# Patient Record
Sex: FEMALE | Race: WHITE | URBAN - METROPOLITAN AREA
[De-identification: names, ages, dates, MRNs, and addresses within clinical notes are randomized per-mention and may not be internally consistent; named-entity substitution may affect disease eponyms.]

---

## 2020-09-15 ENCOUNTER — APPOINTMENT (RX ONLY)
Dept: URBAN - METROPOLITAN AREA CLINIC 374 | Facility: CLINIC | Age: 70
Setting detail: DERMATOLOGY
End: 2020-09-15

## 2020-09-15 DIAGNOSIS — L81.4 OTHER MELANIN HYPERPIGMENTATION: ICD-10-CM

## 2020-09-15 DIAGNOSIS — D22 MELANOCYTIC NEVI: ICD-10-CM

## 2020-09-15 DIAGNOSIS — Z71.89 OTHER SPECIFIED COUNSELING: ICD-10-CM

## 2020-09-15 DIAGNOSIS — L82.1 OTHER SEBORRHEIC KERATOSIS: ICD-10-CM

## 2020-09-15 DIAGNOSIS — D18.0 HEMANGIOMA: ICD-10-CM

## 2020-09-15 DIAGNOSIS — L57.0 ACTINIC KERATOSIS: ICD-10-CM

## 2020-09-15 DIAGNOSIS — D17 BENIGN LIPOMATOUS NEOPLASM: ICD-10-CM

## 2020-09-15 DIAGNOSIS — D485 NEOPLASM OF UNCERTAIN BEHAVIOR OF SKIN: ICD-10-CM

## 2020-09-15 DIAGNOSIS — L82.0 INFLAMED SEBORRHEIC KERATOSIS: ICD-10-CM

## 2020-09-15 PROBLEM — D48.5 NEOPLASM OF UNCERTAIN BEHAVIOR OF SKIN: Status: ACTIVE | Noted: 2020-09-15

## 2020-09-15 PROBLEM — D18.01 HEMANGIOMA OF SKIN AND SUBCUTANEOUS TISSUE: Status: ACTIVE | Noted: 2020-09-15

## 2020-09-15 PROBLEM — D22.5 MELANOCYTIC NEVI OF TRUNK: Status: ACTIVE | Noted: 2020-09-15

## 2020-09-15 PROBLEM — D17.22 BENIGN LIPOMATOUS NEOPLASM OF SKIN AND SUBCUTANEOUS TISSUE OF LEFT ARM: Status: ACTIVE | Noted: 2020-09-15

## 2020-09-15 PROCEDURE — ? PHOTO-DOCUMENTATION

## 2020-09-15 PROCEDURE — 99203 OFFICE O/P NEW LOW 30 MIN: CPT

## 2020-09-15 PROCEDURE — ? COUNSELING

## 2020-09-15 PROCEDURE — ? DEFER

## 2020-09-15 PROCEDURE — ? FULL BODY SKIN EXAM

## 2020-09-15 ASSESSMENT — LOCATION SIMPLE DESCRIPTION DERM
LOCATION SIMPLE: LEFT UPPER BACK
LOCATION SIMPLE: LEFT HAND
LOCATION SIMPLE: RIGHT UPPER ARM
LOCATION SIMPLE: LEFT NOSE
LOCATION SIMPLE: ABDOMEN
LOCATION SIMPLE: LEFT SHOULDER
LOCATION SIMPLE: LEFT ZYGOMA
LOCATION SIMPLE: LEFT CHEEK
LOCATION SIMPLE: RIGHT FOREHEAD
LOCATION SIMPLE: NOSE
LOCATION SIMPLE: RIGHT CHEEK
LOCATION SIMPLE: LEFT UPPER ARM
LOCATION SIMPLE: CHEST
LOCATION SIMPLE: RIGHT UPPER BACK
LOCATION SIMPLE: UPPER BACK
LOCATION SIMPLE: LEFT THIGH

## 2020-09-15 ASSESSMENT — LOCATION ZONE DERM
LOCATION ZONE: HAND
LOCATION ZONE: ARM
LOCATION ZONE: LEG
LOCATION ZONE: NOSE
LOCATION ZONE: FACE
LOCATION ZONE: TRUNK

## 2020-09-15 ASSESSMENT — LOCATION DETAILED DESCRIPTION DERM
LOCATION DETAILED: LEFT ANTERIOR SHOULDER
LOCATION DETAILED: LEFT MID-UPPER BACK
LOCATION DETAILED: LEFT ANTERIOR DISTAL UPPER ARM
LOCATION DETAILED: RIGHT INFERIOR CENTRAL MALAR CHEEK
LOCATION DETAILED: EPIGASTRIC SKIN
LOCATION DETAILED: LEFT RADIAL DORSAL HAND
LOCATION DETAILED: RIGHT INFERIOR MEDIAL UPPER BACK
LOCATION DETAILED: LEFT ANTERIOR PROXIMAL THIGH
LOCATION DETAILED: RIGHT LATERAL SUPERIOR CHEST
LOCATION DETAILED: LEFT LATERAL ZYGOMA
LOCATION DETAILED: NASAL DORSUM
LOCATION DETAILED: INFERIOR THORACIC SPINE
LOCATION DETAILED: LEFT NASAL SIDEWALL
LOCATION DETAILED: UPPER STERNUM
LOCATION DETAILED: LEFT INFERIOR CENTRAL MALAR CHEEK
LOCATION DETAILED: LEFT SUPERIOR CENTRAL MALAR CHEEK
LOCATION DETAILED: RIGHT INFERIOR MEDIAL FOREHEAD
LOCATION DETAILED: RIGHT ANTERIOR DISTAL UPPER ARM

## 2020-09-15 NOTE — PROCEDURE: DEFER
Detail Level: Detailed
Introduction Text (Please End With A Colon): The following procedure was deferred:full body exam
Procedure To Be Performed At Next Visit: Cryotherapy
Other Procedure: 0.6
Procedure To Be Performed At Next Visit: Shave Removal
Other Procedure: 1.1
Other Procedure: 1.5
Procedure To Be Performed At Next Visit: Biopsy by shave method
Other Procedure: general surgeon

## 2020-10-27 ENCOUNTER — APPOINTMENT (RX ONLY)
Dept: URBAN - METROPOLITAN AREA CLINIC 374 | Facility: CLINIC | Age: 70
Setting detail: DERMATOLOGY
End: 2020-10-27

## 2020-10-27 DIAGNOSIS — L82.0 INFLAMED SEBORRHEIC KERATOSIS: ICD-10-CM

## 2020-10-27 DIAGNOSIS — D485 NEOPLASM OF UNCERTAIN BEHAVIOR OF SKIN: ICD-10-CM

## 2020-10-27 DIAGNOSIS — L57.0 ACTINIC KERATOSIS: ICD-10-CM

## 2020-10-27 PROBLEM — D48.5 NEOPLASM OF UNCERTAIN BEHAVIOR OF SKIN: Status: ACTIVE | Noted: 2020-10-27

## 2020-10-27 PROCEDURE — ? PHOTO-DOCUMENTATION

## 2020-10-27 PROCEDURE — 17110 DESTRUCTION B9 LES UP TO 14: CPT

## 2020-10-27 PROCEDURE — ? LIQUID NITROGEN

## 2020-10-27 PROCEDURE — 11102 TANGNTL BX SKIN SINGLE LES: CPT | Mod: 59

## 2020-10-27 PROCEDURE — 17000 DESTRUCT PREMALG LESION: CPT | Mod: 59

## 2020-10-27 PROCEDURE — ? BIOPSY BY SHAVE METHOD

## 2020-10-27 ASSESSMENT — TOTAL NUMBER OF LESIONS: # OF LESIONS?: 1

## 2020-10-27 ASSESSMENT — LOCATION ZONE DERM
LOCATION ZONE: TRUNK
LOCATION ZONE: FACE

## 2020-10-27 ASSESSMENT — LOCATION SIMPLE DESCRIPTION DERM
LOCATION SIMPLE: LEFT CHEEK
LOCATION SIMPLE: ABDOMEN
LOCATION SIMPLE: LEFT UPPER BACK
LOCATION SIMPLE: LEFT BREAST

## 2020-10-27 ASSESSMENT — LOCATION DETAILED DESCRIPTION DERM
LOCATION DETAILED: SUBXIPHOID
LOCATION DETAILED: LEFT INFRAMAMMARY CREASE (INNER QUADRANT)
LOCATION DETAILED: LEFT CENTRAL MALAR CHEEK
LOCATION DETAILED: LEFT MEDIAL UPPER BACK
LOCATION DETAILED: LEFT MID-UPPER BACK

## 2020-10-27 NOTE — PROCEDURE: LIQUID NITROGEN
Total Number Of Lesions Treated: 10
Render Post Care In The Note?: yes
Consent: The patient's consent was obtained including but not limited to risks of crusting, scabbing, blistering, scarring, darker or lighter pigmentary change, recurrence, incomplete removal and infection.
Detail Level: Zone
Number Of Freeze-Thaw Cycles: 1 freeze-thaw cycle
Render In Bullet Format When Appropriate: No
Medical Necessity Clause: This procedure was medically necessary because the lesions that were treated were:
Medical Necessity Information: It is in your best interest to select a reason for this procedure from the list below. All of these items fulfill various CMS LCD requirements except the new and changing color options.
Post-Care Instructions: I reviewed with the patient in detail post-care instructions. Patient is to wear sunprotection, and avoid picking at any of the treated lesions. Pt may apply Vaseline to crusted or scabbing areas.
Number Of Freeze-Thaw Cycles: 2 freeze-thaw cycles
Detail Level: Detailed
Duration Of Freeze Thaw-Cycle (Seconds): 4

## 2020-10-27 NOTE — PROCEDURE: BIOPSY BY SHAVE METHOD
Detail Level: Detailed
Depth Of Biopsy: dermis
Was A Bandage Applied: Yes
Size Of Lesion In Cm: 0
Biopsy Type: H and E
Biopsy Method: Dermablade
Anesthesia Type: 1% lidocaine without epinephrine
Anesthesia Volume In Cc (Will Not Render If 0): 0.5
Hemostasis: Electrocautery and Aluminum Chloride
Wound Care: Petrolatum
Dressing: bandage
Destruction After The Procedure: No
Type Of Destruction Used: Curettage
Curettage Text: The wound bed was treated with curettage after the biopsy was performed.
Cryotherapy Text: The wound bed was treated with cryotherapy after the biopsy was performed.
Electrodesiccation Text: The wound bed was treated with electrodesiccation after the biopsy was performed.
Electrodesiccation And Curettage Text: The wound bed was treated with electrodesiccation and curettage after the biopsy was performed.
Silver Nitrate Text: The wound bed was treated with silver nitrate after the biopsy was performed.
Lab: [id number]
Path Notes (To The Dermatopathologist): Please check margins
Consent: Written consent was obtained and risks were reviewed including but not limited to scarring, infection, bleeding, scabbing, incomplete removal, nerve damage and allergy to anesthesia.
Post-Care Instructions: I reviewed with the patient in detail post-care instructions. Patient is to keep the biopsy site dry overnight, and then apply bacitracin twice daily until healed. Patient may apply hydrogen peroxide soaks to remove any crusting.
Notification Instructions: Patient will be notified of biopsy results. However, patient instructed to call the office if not contacted within 2 weeks.
Billing Type: Third-Party Bill
Information: Selecting Yes will display possible errors in your note based on the variables you have selected. This validation is only offered as a suggestion for you. PLEASE NOTE THAT THE VALIDATION TEXT WILL BE REMOVED WHEN YOU FINALIZE YOUR NOTE. IF YOU WANT TO FAX A PRELIMINARY NOTE YOU WILL NEED TO TOGGLE THIS TO 'NO' IF YOU DO NOT WANT IT IN YOUR FAXED NOTE.

## 2020-12-01 ENCOUNTER — APPOINTMENT (RX ONLY)
Dept: URBAN - METROPOLITAN AREA CLINIC 374 | Facility: CLINIC | Age: 70
Setting detail: DERMATOLOGY
End: 2020-12-01

## 2020-12-01 DIAGNOSIS — L82.0 INFLAMED SEBORRHEIC KERATOSIS: ICD-10-CM

## 2020-12-01 DIAGNOSIS — D22 MELANOCYTIC NEVI: ICD-10-CM

## 2020-12-01 PROBLEM — D22.5 MELANOCYTIC NEVI OF TRUNK: Status: ACTIVE | Noted: 2020-12-01

## 2020-12-01 PROCEDURE — ? PUNCH EXCISION

## 2020-12-01 PROCEDURE — ? PHOTO-DOCUMENTATION

## 2020-12-01 PROCEDURE — ? BENIGN DESTRUCTION

## 2020-12-01 PROCEDURE — 12031 INTMD RPR S/A/T/EXT 2.5 CM/<: CPT | Mod: 59

## 2020-12-01 PROCEDURE — 11401 EXC TR-EXT B9+MARG 0.6-1 CM: CPT | Mod: 59

## 2020-12-01 PROCEDURE — 17110 DESTRUCTION B9 LES UP TO 14: CPT

## 2020-12-01 ASSESSMENT — LOCATION DETAILED DESCRIPTION DERM
LOCATION DETAILED: LEFT MID-UPPER BACK
LOCATION DETAILED: SUBXIPHOID
LOCATION DETAILED: RIGHT RIB CAGE
LOCATION DETAILED: RIGHT RADIAL DORSAL HAND

## 2020-12-01 ASSESSMENT — LOCATION SIMPLE DESCRIPTION DERM
LOCATION SIMPLE: ABDOMEN
LOCATION SIMPLE: LEFT UPPER BACK
LOCATION SIMPLE: RIGHT HAND

## 2020-12-01 ASSESSMENT — LOCATION ZONE DERM
LOCATION ZONE: HAND
LOCATION ZONE: TRUNK

## 2020-12-01 NOTE — PROCEDURE: BENIGN DESTRUCTION
Consent: The patient's consent was obtained including but not limited to risks of crusting, scabbing, blistering, scarring, darker or lighter pigmentary change, recurrence, incomplete removal and infection.
Detail Level: Detailed
Treatment Number (Will Not Render If 0): 1
Add 52 Modifier (Optional): no
Medical Necessity Information: It is in your best interest to select a reason for this procedure from the list below. All of these items fulfill various CMS LCD requirements except the new and changing color options.
Post-Care Instructions: I reviewed with the patient in detail post-care instructions. Patient is to wear sunprotection, and avoid picking at any of the treated lesions. Pt may apply Vaseline to crusted or scabbing areas.
Medical Necessity Clause: This procedure was medically necessary because the lesions that were treated were:
Treatment Number (Will Not Render If 0): 4

## 2020-12-15 ENCOUNTER — APPOINTMENT (RX ONLY)
Dept: URBAN - METROPOLITAN AREA CLINIC 374 | Facility: CLINIC | Age: 70
Setting detail: DERMATOLOGY
End: 2020-12-15

## 2020-12-15 DIAGNOSIS — Z48.817 ENCOUNTER FOR SURGICAL AFTERCARE FOLLOWING SURGERY ON THE SKIN AND SUBCUTANEOUS TISSUE: ICD-10-CM

## 2020-12-15 PROCEDURE — ? POST-OP WOUND CHECK

## 2020-12-15 PROCEDURE — ? PATHOLOGY DISCUSSION

## 2020-12-15 PROCEDURE — ? PHOTO-DOCUMENTATION

## 2020-12-15 PROCEDURE — 99024 POSTOP FOLLOW-UP VISIT: CPT

## 2020-12-15 ASSESSMENT — LOCATION DETAILED DESCRIPTION DERM: LOCATION DETAILED: LEFT MID-UPPER BACK

## 2020-12-15 ASSESSMENT — LOCATION SIMPLE DESCRIPTION DERM: LOCATION SIMPLE: LEFT UPPER BACK

## 2020-12-15 ASSESSMENT — LOCATION ZONE DERM: LOCATION ZONE: TRUNK

## 2020-12-15 NOTE — PROCEDURE: POST-OP WOUND CHECK
Detail Level: Detailed
Add 98791 Cpt? (Important Note: In 2017 The Use Of 55568 Is Being Tracked By Cms To Determine Future Global Period Reimbursement For Global Periods): yes

## 2020-12-15 NOTE — PROCEDURE: PATHOLOGY DISCUSSION
Detail Level: Zone
Quality 265: Biopsy Follow-Up: Biopsy results reviewed, communicated, tracked, and documented
Add 45867 Cpt? (Important Note: In 2017 The Use Of 88734 Is Being Tracked By Cms To Determine Future Global Period Reimbursement For Global Periods): yes

## 2024-01-08 ENCOUNTER — APPOINTMENT (RX ONLY)
Dept: URBAN - METROPOLITAN AREA CLINIC 374 | Facility: CLINIC | Age: 74
Setting detail: DERMATOLOGY
End: 2024-01-08

## 2024-01-08 DIAGNOSIS — L259 CONTACT DERMATITIS AND OTHER ECZEMA, UNSPECIFIED CAUSE: ICD-10-CM | Status: INADEQUATELY CONTROLLED

## 2024-01-08 DIAGNOSIS — D485 NEOPLASM OF UNCERTAIN BEHAVIOR OF SKIN: ICD-10-CM

## 2024-01-08 PROBLEM — L23.0 ALLERGIC CONTACT DERMATITIS DUE TO METALS: Status: ACTIVE | Noted: 2024-01-08

## 2024-01-08 PROBLEM — D48.5 NEOPLASM OF UNCERTAIN BEHAVIOR OF SKIN: Status: ACTIVE | Noted: 2024-01-08

## 2024-01-08 PROCEDURE — 99203 OFFICE O/P NEW LOW 30 MIN: CPT | Mod: 25

## 2024-01-08 PROCEDURE — ? BIOPSY BY SHAVE METHOD

## 2024-01-08 PROCEDURE — ? MEDICATION COUNSELING

## 2024-01-08 PROCEDURE — ? COUNSELING

## 2024-01-08 PROCEDURE — ? PRESCRIPTION MEDICATION MANAGEMENT

## 2024-01-08 PROCEDURE — ? PRESCRIPTION

## 2024-01-08 PROCEDURE — 11102 TANGNTL BX SKIN SINGLE LES: CPT

## 2024-01-08 RX ORDER — HYDROCORTISONE 25 MG/G
OINTMENT TOPICAL
Qty: 28.35 | Refills: 3 | Status: ERX | COMMUNITY
Start: 2024-01-08

## 2024-01-08 RX ADMIN — HYDROCORTISONE: 25 OINTMENT TOPICAL at 00:00

## 2024-01-08 ASSESSMENT — LOCATION DETAILED DESCRIPTION DERM
LOCATION DETAILED: RIGHT INFERIOR CENTRAL MALAR CHEEK
LOCATION DETAILED: LEFT CENTRAL POSTAURICULAR SKIN
LOCATION DETAILED: RIGHT CENTRAL POSTAURICULAR SKIN
LOCATION DETAILED: RIGHT INFERIOR POSTAURICULAR SKIN
LOCATION DETAILED: LEFT INFERIOR POSTAURICULAR SKIN

## 2024-01-08 ASSESSMENT — LOCATION SIMPLE DESCRIPTION DERM
LOCATION SIMPLE: RIGHT CHEEK
LOCATION SIMPLE: SCALP

## 2024-01-08 ASSESSMENT — LOCATION ZONE DERM
LOCATION ZONE: SCALP
LOCATION ZONE: FACE

## 2024-01-08 NOTE — PROCEDURE: MEDICATION COUNSELING
Doxepin Counseling:  Patient advised that the medication is sedating and not to drive a car after taking this medication. Patient informed of potential adverse effects including but not limited to dry mouth, urinary retention, and blurry vision.  The patient verbalized understanding of the proper use and possible adverse effects of doxepin.  All of the patient's questions and concerns were addressed.
VTAMA Counseling: I discussed with the patient that VTAMA is not for use in the eyes, mouth or mouth. They should call the office if they develop any signs of allergic reactions to VTAMA. The patient verbalized understanding of the proper use and possible adverse effects of VTAMA.  All of the patient's questions and concerns were addressed.
Mirvaso Counseling: Mirvaso is a topical medication which can decrease superficial blood flow where applied. Side effects are uncommon and include stinging, redness and allergic reactions.
Erythromycin Counseling:  I discussed with the patient the risks of erythromycin including but not limited to GI upset, allergic reaction, drug rash, diarrhea, increase in liver enzymes, and yeast infections.
Qbrexza Pregnancy And Lactation Text: There is no available data on Qbrexza use in pregnant women.  There is no available data on Qbrexza use in lactation.
Cosentyx Pregnancy And Lactation Text: This medication is Pregnancy Category B and is considered safe during pregnancy. It is unknown if this medication is excreted in breast milk.
Arava Counseling:  Patient counseled regarding adverse effects of Arava including but not limited to nausea, vomiting, abnormalities in liver function tests. Patients may develop mouth sores, rash, diarrhea, and abnormalities in blood counts. The patient understands that monitoring is required including LFTs and blood counts.  There is a rare possibility of scarring of the liver and lung problems that can occur when taking methotrexate. Persistent nausea, loss of appetite, pale stools, dark urine, cough, and shortness of breath should be reported immediately. Patient advised to discontinue Arava treatment and consult with a physician prior to attempting conception. The patient will have to undergo a treatment to eliminate Arava from the body prior to conception.
Azithromycin Pregnancy And Lactation Text: This medication is considered safe during pregnancy and is also secreted in breast milk.
Topical Metronidazole Pregnancy And Lactation Text: This medication is Pregnancy Category B and considered safe during pregnancy.  It is also considered safe to use while breastfeeding.
Xeljesusz Pregnancy And Lactation Text: This medication is Pregnancy Category D and is not considered safe during pregnancy.  The risk during breast feeding is also uncertain.
Finasteride Male Counseling: Finasteride Counseling:  I discussed with the patient the risks of use of finasteride including but not limited to decreased libido, decreased ejaculate volume, gynecomastia, and depression. Women should not handle medication.  All of the patient's questions and concerns were addressed.
Itraconazole Counseling:  I discussed with the patient the risks of itraconazole including but not limited to liver damage, nausea/vomiting, neuropathy, and severe allergy.  The patient understands that this medication is best absorbed when taken with acidic beverages such as non-diet cola or ginger ale.  The patient understands that monitoring is required including baseline LFTs and repeat LFTs at intervals.  The patient understands that they are to contact us or the primary physician if concerning signs are noted.
Topical Retinoid counseling:  Patient advised to apply a pea-sized amount only at bedtime and wait 30 minutes after washing their face before applying.  If too drying, patient may add a non-comedogenic moisturizer. The patient verbalized understanding of the proper use and possible adverse effects of retinoids.  All of the patient's questions and concerns were addressed.
Litfulo Counseling: I discussed with the patient the risks of Litfulo therapy including but not limited to upper respiratory tract infections, shingles, cold sores, and nausea. Live vaccines should be avoided.  This medication has been linked to serious infections; higher rate of mortality; malignancy and lymphoproliferative disorders; major adverse cardiovascular events; thrombosis; gastrointestinal perforations; neutropenia; lymphopenia; anemia; liver enzyme elevations; and lipid elevations.
Eucrisa Pregnancy And Lactation Text: This medication has not been assigned a Pregnancy Risk Category but animal studies failed to show danger with the topical medication. It is unknown if the medication is excreted in breast milk.
Cellcept Pregnancy And Lactation Text: This medication is Pregnancy Category D and isn't considered safe during pregnancy. It is unknown if this medication is excreted in breast milk.
Calcipotriene Counseling:  I discussed with the patient the risks of calcipotriene including but not limited to erythema, scaling, itching, and irritation.
Propranolol Counseling:  I discussed with the patient the risks of propranolol including but not limited to low heart rate, low blood pressure, low blood sugar, restlessness and increased cold sensitivity. They should call the office if they experience any of these side effects.
Ivermectin Pregnancy And Lactation Text: This medication is Pregnancy Category C and it isn't known if it is safe during pregnancy. It is also excreted in breast milk.
Nsaids Pregnancy And Lactation Text: These medications are considered safe up to 30 weeks gestation. It is excreted in breast milk.
Skyrizi Pregnancy And Lactation Text: The risk during pregnancy and breastfeeding is uncertain with this medication.
Sarecycline Counseling: Patient advised regarding possible photosensitivity and discoloration of the teeth, skin, lips, tongue and gums.  Patient instructed to avoid sunlight, if possible.  When exposed to sunlight, patients should wear protective clothing, sunglasses, and sunscreen.  The patient was instructed to call the office immediately if the following severe adverse effects occur:  hearing changes, easy bruising/bleeding, severe headache, or vision changes.  The patient verbalized understanding of the proper use and possible adverse effects of sarecycline.  All of the patient's questions and concerns were addressed.
Glycopyrrolate Counseling:  I discussed with the patient the risks of glycopyrrolate including but not limited to skin rash, drowsiness, dry mouth, difficulty urinating, and blurred vision.
Arava Pregnancy And Lactation Text: This medication is Pregnancy Category X and is absolutely contraindicated during pregnancy. It is unknown if it is excreted in breast milk.
Doxepin Pregnancy And Lactation Text: This medication is Pregnancy Category C and it isn't known if it is safe during pregnancy. It is also excreted in breast milk and breast feeding isn't recommended.
Rhofade Counseling: Rhofade is a topical medication which can decrease superficial blood flow where applied. Side effects are uncommon and include stinging, redness and allergic reactions.
Erivedge Counseling- I discussed with the patient the risks of Erivedge including but not limited to nausea, vomiting, diarrhea, constipation, weight loss, changes in the sense of taste, decreased appetite, muscle spasms, and hair loss.  The patient verbalized understanding of the proper use and possible adverse effects of Erivedge.  All of the patient's questions and concerns were addressed.
Erythromycin Pregnancy And Lactation Text: This medication is Pregnancy Category B and is considered safe during pregnancy. It is also excreted in breast milk.
Calcipotriene Pregnancy And Lactation Text: The use of this medication during pregnancy or lactation is not recommended as there is insufficient data.
Topical Steroids Counseling: I discussed with the patient that prolonged use of topical steroids can result in the increased appearance of superficial blood vessels (telangiectasias), lightening (hypopigmentation) and thinning of the skin (atrophy).  Patient understands to avoid using high potency steroids in skin folds, the groin or the face.  The patient verbalized understanding of the proper use and possible adverse effects of topical steroids.  All of the patient's questions and concerns were addressed.
Vtama Pregnancy And Lactation Text: It is unknown if this medication can cause problems during pregnancy and breastfeeding.
Bactrim Counseling:  I discussed with the patient the risks of sulfa antibiotics including but not limited to GI upset, allergic reaction, drug rash, diarrhea, dizziness, photosensitivity, and yeast infections.  Rarely, more serious reactions can occur including but not limited to aplastic anemia, agranulocytosis, methemoglobinemia, blood dyscrasias, liver or kidney failure, lung infiltrates or desquamative/blistering drug rashes.
Mirvaso Pregnancy And Lactation Text: This medication has not been assigned a Pregnancy Risk Category. It is unknown if the medication is excreted in breast milk.
Topical Retinoid Pregnancy And Lactation Text: This medication is Pregnancy Category C. It is unknown if this medication is excreted in breast milk.
Dupixent Counseling: I discussed with the patient the risks of dupilumab including but not limited to eye inflammation and irritation, cold sores, injection site reactions, allergic reactions and increased risk of parasitic infection. The patient understands that monitoring is required and they must alert us or the primary physician if symptoms of infection or other concerning signs are noted.
Hydroquinone Counseling:  Patient advised that medication may result in skin irritation, lightening (hypopigmentation), dryness, and burning.  In the event of skin irritation, the patient was advised to reduce the amount of the drug applied or use it less frequently.  Rarely, spots that are treated with hydroquinone can become darker (pseudoochronosis).  Should this occur, patient instructed to stop medication and call the office. The patient verbalized understanding of the proper use and possible adverse effects of hydroquinone.  All of the patient's questions and concerns were addressed.
Finasteride Pregnancy And Lactation Text: This medication is absolutely contraindicated during pregnancy. It is unknown if it is excreted in breast milk.
Cantharidin Counseling:  I discussed with the patient the risks of Cantharidin including but not limited to pain, redness, burning, itching, and blistering.
Cyclophosphamide Counseling:  I discussed with the patient the risks of cyclophosphamide including but not limited to hair loss, hormonal abnormalities, decreased fertility, abdominal pain, diarrhea, nausea and vomiting, bone marrow suppression and infection. The patient understands that monitoring is required while taking this medication.
Itraconazole Pregnancy And Lactation Text: This medication is Pregnancy Category C and it isn't know if it is safe during pregnancy. It is also excreted in breast milk.
Litfulo Pregnancy And Lactation Text: Based on animal studies, Lifulo may cause embryo-fetal harm when administered to pregnant women.  The medication should not be used in pregnancy.  Breastfeeding is not recommended during treatment.
Acitretin Counseling:  I discussed with the patient the risks of acitretin including but not limited to hair loss, dry lips/skin/eyes, liver damage, hyperlipidemia, depression/suicidal ideation, photosensitivity.  Serious rare side effects can include but are not limited to pancreatitis, pseudotumor cerebri, bony changes, clot formation/stroke/heart attack.  Patient understands that alcohol is contraindicated since it can result in liver toxicity and significantly prolong the elimination of the drug by many years.
Aklief counseling:  Patient advised to apply a pea-sized amount only at bedtime and wait 30 minutes after washing their face before applying.  If too drying, patient may add a non-comedogenic moisturizer.  The most commonly reported side effects including irritation, redness, scaling, dryness, stinging, burning, itching, and increased risk of sunburn.  The patient verbalized understanding of the proper use and possible adverse effects of retinoids.  All of the patient's questions and concerns were addressed.
Propranolol Pregnancy And Lactation Text: This medication is Pregnancy Category C and it isn't known if it is safe during pregnancy. It is excreted in breast milk.
Stelara Counseling:  I discussed with the patient the risks of ustekinumab including but not limited to immunosuppression, malignancy, posterior leukoencephalopathy syndrome, and serious infections.  The patient understands that monitoring is required including a PPD at baseline and must alert us or the primary physician if symptoms of infection or other concerning signs are noted.
Rituxan Counseling:  I discussed with the patient the risks of Rituxan infusions. Side effects can include infusion reactions, severe drug rashes including mucocutaneous reactions, reactivation of latent hepatitis and other infections and rarely progressive multifocal leukoencephalopathy.  All of the patient's questions and concerns were addressed.
Glycopyrrolate Pregnancy And Lactation Text: This medication is Pregnancy Category B and is considered safe during pregnancy. It is unknown if it is excreted breast milk.
Olanzapine Counseling- I discussed with the patient the common side effects of olanzapine including but are not limited to: lack of energy, dry mouth, increased appetite, sleepiness, tremor, constipation, dizziness, changes in behavior, or restlessness.  Explained that teenagers are more likely to experience headaches, abdominal pain, pain in the arms or legs, tiredness, and sleepiness.  Serious side effects include but are not limited: increased risk of death in elderly patients who are confused, have memory loss, or dementia-related psychosis; hyperglycemia; increased cholesterol and triglycerides; and weight gain.
Cantharidin Pregnancy And Lactation Text: This medication has not been proven safe during pregnancy. It is unknown if this medication is excreted in breast milk.
Sarecycline Pregnancy And Lactation Text: This medication is Pregnancy Category D and not consider safe during pregnancy. It is also excreted in breast milk.
Clofazimine Counseling:  I discussed with the patient the risks of clofazimine including but not limited to skin and eye pigmentation, liver damage, nausea/vomiting, gastrointestinal bleeding and allergy.
Zoryve Counseling:  I discussed with the patient that Zoryve is not for use in the eyes, mouth or vagina. The most commonly reported side effects include diarrhea, headache, insomnia, application site pain, upper respiratory tract infections, and urinary tract infections.  All of the patient's questions and concerns were addressed.
Metronidazole Counseling:  I discussed with the patient the risks of metronidazole including but not limited to seizures, nausea/vomiting, a metallic taste in the mouth, nausea/vomiting and severe allergy.
Opzelura Counseling:  I discussed with the patient the risks of Opzelura including but not limited to nasopharngitis, bronchitis, ear infection, eosinophila, hives, diarrhea, folliculitis, tonsillitis, and rhinorrhea.  Taken orally, this medication has been linked to serious infections; higher rate of mortality; malignancy and lymphoproliferative disorders; major adverse cardiovascular events; thrombosis; thrombocytopenia, anemia, and neutropenia; and lipid elevations.
Tazorac Counseling:  Patient advised that medication is irritating and drying.  Patient may need to apply sparingly and wash off after an hour before eventually leaving it on overnight.  The patient verbalized understanding of the proper use and possible adverse effects of tazorac.  All of the patient's questions and concerns were addressed.
Topical Steroids Applications Pregnancy And Lactation Text: Most topical steroids are considered safe to use during pregnancy and lactation.  Any topical steroid applied to the breast or nipple should be washed off before breastfeeding.
Dupixent Pregnancy And Lactation Text: This medication likely crosses the placenta but the risk for the fetus is uncertain. This medication is excreted in breast milk.
Bactrim Pregnancy And Lactation Text: This medication is Pregnancy Category D and is known to cause fetal risk.  It is also excreted in breast milk.
Adbry Counseling: I discussed with the patient the risks of tralokinumab including but not limited to eye infection and irritation, cold sores, injection site reactions, worsening of asthma, allergic reactions and increased risk of parasitic infection.  Live vaccines should be avoided while taking tralokinumab. The patient understands that monitoring is required and they must alert us or the primary physician if symptoms of infection or other concerning signs are noted.
Birth Control Pills Counseling: Birth Control Pill Counseling: I discussed with the patient the potential side effects of OCPs including but not limited to increased risk of stroke, heart attack, thrombophlebitis, deep venous thrombosis, hepatic adenomas, breast changes, GI upset, headaches, and depression.  The patient verbalized understanding of the proper use and possible adverse effects of OCPs. All of the patient's questions and concerns were addressed.
SSKI Counseling:  I discussed with the patient the risks of SSKI including but not limited to thyroid abnormalities, metallic taste, GI upset, fever, headache, acne, arthralgias, paraesthesias, lymphadenopathy, easy bleeding, arrhythmias, and allergic reaction.
Ketoconazole Counseling:   Patient counseled regarding improving absorption with orange juice.  Adverse effects include but are not limited to breast enlargement, headache, diarrhea, nausea, upset stomach, liver function test abnormalities, taste disturbance, and stomach pain.  There is a rare possibility of liver failure that can occur when taking ketoconazole. The patient understands that monitoring of LFTs may be required, especially at baseline. The patient verbalized understanding of the proper use and possible adverse effects of ketoconazole.  All of the patient's questions and concerns were addressed.
5-Fu Counseling: 5-Fluorouracil Counseling:  I discussed with the patient the risks of 5-fluorouracil including but not limited to erythema, scaling, itching, weeping, crusting, and pain.
Olumiant Counseling: I discussed with the patient the risks of Olumiant therapy including but not limited to upper respiratory tract infections, shingles, cold sores, and nausea. Live vaccines should be avoided.  This medication has been linked to serious infections; higher rate of mortality; malignancy and lymphoproliferative disorders; major adverse cardiovascular events; thrombosis; gastrointestinal perforations; neutropenia; lymphopenia; anemia; liver enzyme elevations; and lipid elevations.
Aklief Pregnancy And Lactation Text: It is unknown if this medication is safe to use during pregnancy.  It is unknown if this medication is excreted in breast milk.  Breastfeeding women should use the topical cream on the smallest area of the skin for the shortest time needed while breastfeeding.  Do not apply to nipple and areola.
Cyclophosphamide Pregnancy And Lactation Text: This medication is Pregnancy Category D and it isn't considered safe during pregnancy. This medication is excreted in breast milk.
Acitretin Pregnancy And Lactation Text: This medication is Pregnancy Category X and should not be given to women who are pregnant or may become pregnant in the future. This medication is excreted in breast milk.
Olanzapine Pregnancy And Lactation Text: This medication is pregnancy category C.   There are no adequate and well controlled trials with olanzapine in pregnant females.  Olanzapine should be used during pregnancy only if the potential benefit justifies the potential risk to the fetus.   In a study in lactating healthy women, olanzapine was excreted in breast milk.  It is recommended that women taking olanzapine should not breast feed.
Tetracycline Counseling: Patient counseled regarding possible photosensitivity and increased risk for sunburn.  Patient instructed to avoid sunlight, if possible.  When exposed to sunlight, patients should wear protective clothing, sunglasses, and sunscreen.  The patient was instructed to call the office immediately if the following severe adverse effects occur:  hearing changes, easy bruising/bleeding, severe headache, or vision changes.  The patient verbalized understanding of the proper use and possible adverse effects of tetracycline.  All of the patient's questions and concerns were addressed. Patient understands to avoid pregnancy while on therapy due to potential birth defects.
Libtayo Counseling- I discussed with the patient the risks of Libtayo including but not limited to nausea, vomiting, diarrhea, and bone or muscle pain.  The patient verbalized understanding of the proper use and possible adverse effects of Libtayo.  All of the patient's questions and concerns were addressed.
Hydroxychloroquine Counseling:  I discussed with the patient that a baseline ophthalmologic exam is needed at the start of therapy and every year thereafter while on therapy. A CBC may also be warranted for monitoring.  The side effects of this medication were discussed with the patient, including but not limited to agranulocytosis, aplastic anemia, seizures, rashes, retinopathy, and liver toxicity. Patient instructed to call the office should any adverse effect occur.  The patient verbalized understanding of the proper use and possible adverse effects of Plaquenil.  All the patient's questions and concerns were addressed.
Enbrel Counseling:  I discussed with the patient the risks of etanercept including but not limited to myelosuppression, immunosuppression, autoimmune hepatitis, demyelinating diseases, lymphoma, and infections.  The patient understands that monitoring is required including a PPD at baseline and must alert us or the primary physician if symptoms of infection or other concerning signs are noted.
Clofazimine Pregnancy And Lactation Text: This medication is Pregnancy Category C and isn't considered safe during pregnancy. It is excreted in breast milk.
Metronidazole Pregnancy And Lactation Text: This medication is Pregnancy Category B and considered safe during pregnancy.  It is also excreted in breast milk.
Opzelura Pregnancy And Lactation Text: There is insufficient data to evaluate drug-associated risk for major birth defects, miscarriage, or other adverse maternal or fetal outcomes.  There is a pregnancy registry that monitors pregnancy outcomes in pregnant persons exposed to the medication during pregnancy.  It is unknown if this medication is excreted in breast milk.  Do not breastfeed during treatment and for about 4 weeks after the last dose.
Birth Control Pills Pregnancy And Lactation Text: This medication should be avoided if pregnant and for the first 30 days post-partum.
Methotrexate Counseling:  Patient counseled regarding adverse effects of methotrexate including but not limited to nausea, vomiting, abnormalities in liver function tests. Patients may develop mouth sores, rash, diarrhea, and abnormalities in blood counts. The patient understands that monitoring is required including LFT's and blood counts.  There is a rare possibility of scarring of the liver and lung problems that can occur when taking methotrexate. Persistent nausea, loss of appetite, pale stools, dark urine, cough, and shortness of breath should be reported immediately. Patient advised to discontinue methotrexate treatment at least three months before attempting to become pregnant.  I discussed the need for folate supplements while taking methotrexate.  These supplements can decrease side effects during methotrexate treatment. The patient verbalized understanding of the proper use and possible adverse effects of methotrexate.  All of the patient's questions and concerns were addressed.
Cephalexin Counseling: I counseled the patient regarding use of cephalexin as an antibiotic for prophylactic and/or therapeutic purposes. Cephalexin (commonly prescribed under brand name Keflex) is a cephalosporin antibiotic which is active against numerous classes of bacteria, including most skin bacteria. Side effects may include nausea, diarrhea, gastrointestinal upset, rash, hives, yeast infections, and in rare cases, hepatitis, kidney disease, seizures, fever, confusion, neurologic symptoms, and others. Patients with severe allergies to penicillin medications are cautioned that there is about a 10% incidence of cross-reactivity with cephalosporins. When possible, patients with penicillin allergies should use alternatives to cephalosporins for antibiotic therapy.
Adbry Pregnancy And Lactation Text: It is unknown if this medication will adversely affect pregnancy or breast feeding.
Imiquimod Counseling:  I discussed with the patient the risks of imiquimod including but not limited to erythema, scaling, itching, weeping, crusting, and pain.  Patient understands that the inflammatory response to imiquimod is variable from person to person and was educated regarded proper titration schedule.  If flu-like symptoms develop, patient knows to discontinue the medication and contact us.
Topical Sulfur Applications Counseling: Topical Sulfur Counseling: Patient counseled that this medication may cause skin irritation or allergic reactions.  In the event of skin irritation, the patient was advised to reduce the amount of the drug applied or use it less frequently.   The patient verbalized understanding of the proper use and possible adverse effects of topical sulfur application.  All of the patient's questions and concerns were addressed.
Tazorac Pregnancy And Lactation Text: This medication is not safe during pregnancy. It is unknown if this medication is excreted in breast milk.
Ketoconazole Pregnancy And Lactation Text: This medication is Pregnancy Category C and it isn't know if it is safe during pregnancy. It is also excreted in breast milk and breast feeding isn't recommended.
Cyclosporine Counseling:  I discussed with the patient the risks of cyclosporine including but not limited to hypertension, gingival hyperplasia,myelosuppression, immunosuppression, liver damage, kidney damage, neurotoxicity, lymphoma, and serious infections. The patient understands that monitoring is required including baseline blood pressure, CBC, CMP, lipid panel and uric acid, and then 1-2 times monthly CMP and blood pressure.
Olumiant Pregnancy And Lactation Text: Based on animal studies, Olumiant may cause embryo-fetal harm when administered to pregnant women.  The medication should not be used in pregnancy.  Breastfeeding is not recommended during treatment.
Sski Pregnancy And Lactation Text: This medication is Pregnancy Category D and isn't considered safe during pregnancy. It is excreted in breast milk.
5-Fu Pregnancy And Lactation Text: This medication is Pregnancy Category X and contraindicated in pregnancy and in women who may become pregnant. It is unknown if this medication is excreted in breast milk.
Oral Minoxidil Counseling- I discussed with the patient the risks of oral minoxidil including but not limited to shortness of breath, swelling of the feet or ankles, dizziness, lightheadedness, unwanted hair growth and allergic reaction.  The patient verbalized understanding of the proper use and possible adverse effects of oral minoxidil.  All of the patient's questions and concerns were addressed.
Taltz Counseling: I discussed with the patient the risks of ixekizumab including but not limited to immunosuppression, serious infections, worsening of inflammatory bowel disease and drug reactions.  The patient understands that monitoring is required including a PPD at baseline and must alert us or the primary physician if symptoms of infection or other concerning signs are noted.
Hydroxychloroquine Pregnancy And Lactation Text: This medication has been shown to cause fetal harm but it isn't assigned a Pregnancy Risk Category. There are small amounts excreted in breast milk.
Bexarotene Counseling:  I discussed with the patient the risks of bexarotene including but not limited to hair loss, dry lips/skin/eyes, liver abnormalities, hyperlipidemia, pancreatitis, depression/suicidal ideation, photosensitivity, drug rash/allergic reactions, hypothyroidism, anemia, leukopenia, infection, cataracts, and teratogenicity.  Patient understands that they will need regular blood tests to check lipid profile, liver function tests, white blood cell count, thyroid function tests and pregnancy test if applicable.
Azelaic Acid Counseling: Patient counseled that medicine may cause skin irritation and to avoid applying near the eyes.  In the event of skin irritation, the patient was advised to reduce the amount of the drug applied or use it less frequently.   The patient verbalized understanding of the proper use and possible adverse effects of azelaic acid.  All of the patient's questions and concerns were addressed.
Zyclara Counseling:  I discussed with the patient the risks of imiquimod including but not limited to erythema, scaling, itching, weeping, crusting, and pain.  Patient understands that the inflammatory response to imiquimod is variable from person to person and was educated regarded proper titration schedule.  If flu-like symptoms develop, patient knows to discontinue the medication and contact us.
Libtayo Pregnancy And Lactation Text: This medication is contraindicated in pregnancy and when breast feeding.
Minocycline Counseling: Patient advised regarding possible photosensitivity and discoloration of the teeth, skin, lips, tongue and gums.  Patient instructed to avoid sunlight, if possible.  When exposed to sunlight, patients should wear protective clothing, sunglasses, and sunscreen.  The patient was instructed to call the office immediately if the following severe adverse effects occur:  hearing changes, easy bruising/bleeding, severe headache, or vision changes.  The patient verbalized understanding of the proper use and possible adverse effects of minocycline.  All of the patient's questions and concerns were addressed.
Colchicine Counseling:  Patient counseled regarding adverse effects including but not limited to stomach upset (nausea, vomiting, stomach pain, or diarrhea).  Patient instructed to limit alcohol consumption while taking this medication.  Colchicine may reduce blood counts especially with prolonged use.  The patient understands that monitoring of kidney function and blood counts may be required, especially at baseline. The patient verbalized understanding of the proper use and possible adverse effects of colchicine.  All of the patient's questions and concerns were addressed.
Rituxan Pregnancy And Lactation Text: This medication is Pregnancy Category C and it isn't know if it is safe during pregnancy. It is unknown if this medication is excreted in breast milk but similar antibodies are known to be excreted.
Methotrexate Pregnancy And Lactation Text: This medication is Pregnancy Category X and is known to cause fetal harm. This medication is excreted in breast milk.
Bimzelx Counseling:  I discussed with the patient the risks of Bimzelx including but not limited to depression, immunosuppression, allergic reactions and infections.  The patient understands that monitoring is required including a PPD at baseline and must alert us or the primary physician if symptoms of infection or other concerning signs are noted.
Detail Level: Detailed
Spironolactone Counseling: Patient advised regarding risks of diarrhea, abdominal pain, hyperkalemia, birth defects (for female patients), liver toxicity and renal toxicity. The patient may need blood work to monitor liver and kidney function and potassium levels while on therapy. The patient verbalized understanding of the proper use and possible adverse effects of spironolactone.  All of the patient's questions and concerns were addressed.
Cephalexin Pregnancy And Lactation Text: This medication is Pregnancy Category B and considered safe during pregnancy.  It is also excreted in breast milk but can be used safely for shorter doses.
Topical Sulfur Applications Pregnancy And Lactation Text: This medication is considered safe during pregnancy and breast feeding secondary to limited systemic absorption.
Tranexamic Acid Counseling:  Patient advised of the small risk of bleeding problems with tranexamic acid. They were also instructed to call if they developed any nausea, vomiting or diarrhea. All of the patient's questions and concerns were addressed.
Picato Counseling:  I discussed with the patient the risks of Picato including but not limited to erythema, scaling, itching, weeping, crusting, and pain.
Hydroxyzine Counseling: Patient advised that the medication is sedating and not to drive a car after taking this medication.  Patient informed of potential adverse effects including but not limited to dry mouth, urinary retention, and blurry vision.  The patient verbalized understanding of the proper use and possible adverse effects of hydroxyzine.  All of the patient's questions and concerns were addressed.
Rinvoq Counseling: I discussed with the patient the risks of Rinvoq therapy including but not limited to upper respiratory tract infections, shingles, cold sores, bronchitis, nausea, cough, fever, acne, and headache. Live vaccines should be avoided.  This medication has been linked to serious infections; higher rate of mortality; malignancy and lymphoproliferative disorders; major adverse cardiovascular events; thrombosis; thrombocytopenia, anemia, and neutropenia; lipid elevations; liver enzyme elevations; and gastrointestinal perforations.
Topical Clindamycin Counseling: Patient counseled that this medication may cause skin irritation or allergic reactions.  In the event of skin irritation, the patient was advised to reduce the amount of the drug applied or use it less frequently.   The patient verbalized understanding of the proper use and possible adverse effects of clindamycin.  All of the patient's questions and concerns were addressed.
Cyclosporine Pregnancy And Lactation Text: This medication is Pregnancy Category C and it isn't know if it is safe during pregnancy. This medication is excreted in breast milk.
Drysol Counseling:  I discussed with the patient the risks of drysol/aluminum chloride including but not limited to skin rash, itching, irritation, burning.
Thalidomide Counseling: I discussed with the patient the risks of thalidomide including but not limited to birth defects, anxiety, weakness, chest pain, dizziness, cough and severe allergy.
Terbinafine Counseling: Patient counseling regarding adverse effects of terbinafine including but not limited to headache, diarrhea, rash, upset stomach, liver function test abnormalities, itching, taste/smell disturbance, nausea, abdominal pain, and flatulence.  There is a rare possibility of liver failure that can occur when taking terbinafine.  The patient understands that a baseline LFT and kidney function test may be required. The patient verbalized understanding of the proper use and possible adverse effects of terbinafine.  All of the patient's questions and concerns were addressed.
Oral Minoxidil Pregnancy And Lactation Text: This medication should only be used when clearly needed if you are pregnant, attempting to become pregnant or breast feeding.
Bexarotene Pregnancy And Lactation Text: This medication is Pregnancy Category X and should not be given to women who are pregnant or may become pregnant. This medication should not be used if you are breast feeding.
Low Dose Naltrexone Counseling- I discussed with the patient the potential risks and side effects of low dose naltrexone including but not limited to: more vivid dreams, headaches, nausea, vomiting, abdominal pain, fatigue, dizziness, and anxiety.
Azelaic Acid Pregnancy And Lactation Text: This medication is considered safe during pregnancy and breast feeding.
Siliq Counseling:  I discussed with the patient the risks of Siliq including but not limited to new or worsening depression, suicidal thoughts and behavior, immunosuppression, malignancy, posterior leukoencephalopathy syndrome, and serious infections.  The patient understands that monitoring is required including a PPD at baseline and must alert us or the primary physician if symptoms of infection or other concerning signs are noted. There is also a special program designed to monitor depression which is required with Siliq.
Humira Counseling:  I discussed with the patient the risks of adalimumab including but not limited to myelosuppression, immunosuppression, autoimmune hepatitis, demyelinating diseases, lymphoma, and serious infections.  The patient understands that monitoring is required including a PPD at baseline and must alert us or the primary physician if symptoms of infection or other concerning signs are noted.
Odomzo Counseling- I discussed with the patient the risks of Odomzo including but not limited to nausea, vomiting, diarrhea, constipation, weight loss, changes in the sense of taste, decreased appetite, muscle spasms, and hair loss.  The patient verbalized understanding of the proper use and possible adverse effects of Odomzo.  All of the patient's questions and concerns were addressed.
Wartpeel Counseling:  I discussed with the patient the risks of Wartpeel including but not limited to erythema, scaling, itching, weeping, crusting, and pain.
Clindamycin Counseling: I counseled the patient regarding use of clindamycin as an antibiotic for prophylactic and/or therapeutic purposes. Clindamycin is active against numerous classes of bacteria, including skin bacteria. Side effects may include nausea, diarrhea, gastrointestinal upset, rash, hives, yeast infections, and in rare cases, colitis.
Tranexamic Acid Pregnancy And Lactation Text: It is unknown if this medication is safe during pregnancy or breast feeding.
Topical Clindamycin Pregnancy And Lactation Text: This medication is Pregnancy Category B and is considered safe during pregnancy. It is unknown if it is excreted in breast milk.
Bimzelx Pregnancy And Lactation Text: This medication crosses the placenta and the safety is uncertain during pregnancy. It is unknown if this medication is present in breast milk.
Spironolactone Pregnancy And Lactation Text: This medication can cause feminization of the male fetus and should be avoided during pregnancy. The active metabolite is also found in breast milk.
Include Pregnancy/Lactation Warning?: No
Hydroxyzine Pregnancy And Lactation Text: This medication is not safe during pregnancy and should not be taken. It is also excreted in breast milk and breast feeding isn't recommended.
Prednisone Counseling:  I discussed with the patient the risks of prolonged use of prednisone including but not limited to weight gain, insomnia, osteoporosis, mood changes, diabetes, susceptibility to infection, glaucoma and high blood pressure.  In cases where prednisone use is prolonged, patients should be monitored with blood pressure checks, serum glucose levels and an eye exam.  Additionally, the patient may need to be placed on GI prophylaxis, PCP prophylaxis, and calcium and vitamin D supplementation and/or a bisphosphonate.  The patient verbalized understanding of the proper use and the possible adverse effects of prednisone.  All of the patient's questions and concerns were addressed.
Klisyri Counseling:  I discussed with the patient the risks of Klisyri including but not limited to erythema, scaling, itching, weeping, crusting, and pain.
Rinvoq Pregnancy And Lactation Text: Based on animal studies, Rinvoq may cause embryo-fetal harm when administered to pregnant women.  The medication should not be used in pregnancy.  Breastfeeding is not recommended during treatment and for 6 days after the last dose.
Fluconazole Counseling:  Patient counseled regarding adverse effects of fluconazole including but not limited to headache, diarrhea, nausea, upset stomach, liver function test abnormalities, taste disturbance, and stomach pain.  There is a rare possibility of liver failure that can occur when taking fluconazole.  The patient understands that monitoring of LFTs and kidney function test may be required, especially at baseline. The patient verbalized understanding of the proper use and possible adverse effects of fluconazole.  All of the patient's questions and concerns were addressed.
Tremfya Counseling: I discussed with the patient the risks of guselkumab including but not limited to immunosuppression, serious infections, and drug reactions.  The patient understands that monitoring is required including a PPD at baseline and must alert us or the primary physician if symptoms of infection or other concerning signs are noted.
Solaraze Counseling:  I discussed with the patient the risks of Solaraze including but not limited to erythema, scaling, itching, weeping, crusting, and pain.
Terbinafine Pregnancy And Lactation Text: This medication is Pregnancy Category B and is considered safe during pregnancy. It is also excreted in breast milk and breast feeding isn't recommended.
Isotretinoin Counseling: Patient should get monthly blood tests, not donate blood, not drive at night if vision affected, not share medication, and not undergo elective surgery for 6 months after tx completed. Side effects reviewed, pt to contact office should one occur.
Benzoyl Peroxide Counseling: Patient counseled that medicine may cause skin irritation and bleach clothing.  In the event of skin irritation, the patient was advised to reduce the amount of the drug applied or use it less frequently.   The patient verbalized understanding of the proper use and possible adverse effects of benzoyl peroxide.  All of the patient's questions and concerns were addressed.
Low Dose Naltrexone Pregnancy And Lactation Text: Naltrexone is pregnancy category C.  There have been no adequate and well-controlled studies in pregnant women.  It should be used in pregnancy only if the potential benefit justifies the potential risk to the fetus.   Limited data indicates that naltrexone is minimally excreted into breastmilk.
Otezla Counseling: The side effects of Otezla were discussed with the patient, including but not limited to worsening or new depression, weight loss, diarrhea, nausea, upper respiratory tract infection, and headache. Patient instructed to call the office should any adverse effect occur.  The patient verbalized understanding of the proper use and possible adverse effects of Otezla.  All the patient's questions and concerns were addressed.
Dapsone Counseling: I discussed with the patient the risks of dapsone including but not limited to hemolytic anemia, agranulocytosis, rashes, methemoglobinemia, kidney failure, peripheral neuropathy, headaches, GI upset, and liver toxicity.  Patients who start dapsone require monitoring including baseline LFTs and weekly CBCs for the first month, then every month thereafter.  The patient verbalized understanding of the proper use and possible adverse effects of dapsone.  All of the patient's questions and concerns were addressed.
Quinolones Counseling:  I discussed with the patient the risks of fluoroquinolones including but not limited to GI upset, allergic reaction, drug rash, diarrhea, dizziness, photosensitivity, yeast infections, liver function test abnormalities, tendonitis/tendon rupture.
Protopic Counseling: Patient may experience a mild burning sensation during topical application. Protopic is not approved in children less than 2 years of age. There have been case reports of hematologic and skin malignancies in patients using topical calcineurin inhibitors although causality is questionable.
Valtrex Counseling: I discussed with the patient the risks of valacyclovir including but not limited to kidney damage, nausea, vomiting and severe allergy.  The patient understands that if the infection seems to be worsening or is not improving, they are to call.
Topical Ketoconazole Counseling: Patient counseled that this medication may cause skin irritation or allergic reactions.  In the event of skin irritation, the patient was advised to reduce the amount of the drug applied or use it less frequently.   The patient verbalized understanding of the proper use and possible adverse effects of ketoconazole.  All of the patient's questions and concerns were addressed.
Sotyktu Counseling:  I discussed the most common side effects of Sotyktu including: common cold, sore throat, sinus infections, cold sores, canker sores, folliculitis, and acne.  I also discussed more serious side effects of Sotyktu including but not limited to: serious allergic reactions; increased risk for infections such as TB; cancers such as lymphomas; rhabdomyolysis and elevated CPK; and elevated triglycerides and liver enzymes. 
Clindamycin Pregnancy And Lactation Text: This medication can be used in pregnancy if certain situations. Clindamycin is also present in breast milk.
Klisyri Pregnancy And Lactation Text: It is unknown if this medication can harm a developing fetus or if it is excreted in breast milk.
Solaraze Pregnancy And Lactation Text: This medication is Pregnancy Category B and is considered safe. There is some data to suggest avoiding during the third trimester. It is unknown if this medication is excreted in breast milk.
Cimzia Counseling:  I discussed with the patient the risks of Cimzia including but not limited to immunosuppression, allergic reactions and infections.  The patient understands that monitoring is required including a PPD at baseline and must alert us or the primary physician if symptoms of infection or other concerning signs are noted.
Elidel Counseling: Patient may experience a mild burning sensation during topical application. Elidel is not approved in children less than 2 years of age. There have been case reports of hematologic and skin malignancies in patients using topical calcineurin inhibitors although causality is questionable.
Opioid Counseling: I discussed with the patient the potential side effects of opioids including but not limited to addiction, altered mental status, and depression. I stressed avoiding alcohol, benzodiazepines, muscle relaxants and sleep aids unless specifically okayed by a physician. The patient verbalized understanding of the proper use and possible adverse effects of opioids. All of the patient's questions and concerns were addressed. They were instructed to flush the remaining pills down the toilet if they did not need them for pain.
Benzoyl Peroxide Pregnancy And Lactation Text: This medication is Pregnancy Category C. It is unknown if benzoyl peroxide is excreted in breast milk.
Albendazole Counseling:  I discussed with the patient the risks of albendazole including but not limited to cytopenia, kidney damage, nausea/vomiting and severe allergy.  The patient understands that this medication is being used in an off-label manner.
Azathioprine Counseling:  I discussed with the patient the risks of azathioprine including but not limited to myelosuppression, immunosuppression, hepatotoxicity, lymphoma, and infections.  The patient understands that monitoring is required including baseline LFTs, Creatinine, possible TPMP genotyping and weekly CBCs for the first month and then every 2 weeks thereafter.  The patient verbalized understanding of the proper use and possible adverse effects of azathioprine.  All of the patient's questions and concerns were addressed.
Otezla Pregnancy And Lactation Text: This medication is Pregnancy Category C and it isn't known if it is safe during pregnancy. It is unknown if it is excreted in breast milk.
Isotretinoin Pregnancy And Lactation Text: This medication is Pregnancy Category X and is considered extremely dangerous during pregnancy. It is unknown if it is excreted in breast milk.
Simponi Counseling:  I discussed with the patient the risks of golimumab including but not limited to myelosuppression, immunosuppression, autoimmune hepatitis, demyelinating diseases, lymphoma, and serious infections.  The patient understands that monitoring is required including a PPD at baseline and must alert us or the primary physician if symptoms of infection or other concerning signs are noted.
Dapsone Pregnancy And Lactation Text: This medication is Pregnancy Category C and is not considered safe during pregnancy or breast feeding.
Niacinamide Counseling: I recommended taking niacin or niacinamide, also know as vitamin B3, twice daily. Recent evidence suggests that taking vitamin B3 (500 mg twice daily) can reduce the risk of actinic keratoses and non-melanoma skin cancers. Side effects of vitamin B3 include flushing and headache.
Protopic Pregnancy And Lactation Text: This medication is Pregnancy Category C. It is unknown if this medication is excreted in breast milk when applied topically.
Ilumya Counseling: I discussed with the patient the risks of tildrakizumab including but not limited to immunosuppression, malignancy, posterior leukoencephalopathy syndrome, and serious infections.  The patient understands that monitoring is required including a PPD at baseline and must alert us or the primary physician if symptoms of infection or other concerning signs are noted.
Winlevi Counseling:  I discussed with the patient the risks of topical clascoterone including but not limited to erythema, scaling, itching, and stinging. Patient voiced their understanding.
Cimetidine Counseling:  I discussed with the patient the risks of Cimetidine including but not limited to gynecomastia, headache, diarrhea, nausea, drowsiness, arrhythmias, pancreatitis, skin rashes, psychosis, bone marrow suppression and kidney toxicity.
Cimzia Pregnancy And Lactation Text: This medication crosses the placenta but can be considered safe in certain situations. Cimzia may be excreted in breast milk.
Doxycycline Counseling:  Patient counseled regarding possible photosensitivity and increased risk for sunburn.  Patient instructed to avoid sunlight, if possible.  When exposed to sunlight, patients should wear protective clothing, sunglasses, and sunscreen.  The patient was instructed to call the office immediately if the following severe adverse effects occur:  hearing changes, easy bruising/bleeding, severe headache, or vision changes.  The patient verbalized understanding of the proper use and possible adverse effects of doxycycline.  All of the patient's questions and concerns were addressed.
Minoxidil Counseling: Minoxidil is a topical medication which can increase blood flow where it is applied. It is uncertain how this medication increases hair growth. Side effects are uncommon and include stinging and allergic reactions.
Soolantra Counseling: I discussed with the patients the risks of topial Soolantra. This is a medicine which decreases the number of mites and inflammation in the skin. You experience burning, stinging, eye irritation or allergic reactions.  Please call our office if you develop any problems from using this medication.
Valtrex Pregnancy And Lactation Text: this medication is Pregnancy Category B and is considered safe during pregnancy. This medication is not directly found in breast milk but it's metabolite acyclovir is present.
Opioid Pregnancy And Lactation Text: These medications can lead to premature delivery and should be avoided during pregnancy. These medications are also present in breast milk in small amounts.
Sotyktu Pregnancy And Lactation Text: There is insufficient data to evaluate whether or not Sotyktu is safe to use during pregnancy.   It is not known if Sotyktu passes into breast milk and whether or not it is safe to use when breastfeeding.  
Dutasteride Male Counseling: Dustasteride Counseling:  I discussed with the patient the risks of use of dutasteride including but not limited to decreased libido, decreased ejaculate volume, and gynecomastia. Women who can become pregnant should not handle medication.  All of the patient's questions and concerns were addressed.
Carac Counseling:  I discussed with the patient the risks of Carac including but not limited to erythema, scaling, itching, weeping, crusting, and pain.
Griseofulvin Counseling:  I discussed with the patient the risks of griseofulvin including but not limited to photosensitivity, cytopenia, liver damage, nausea/vomiting and severe allergy.  The patient understands that this medication is best absorbed when taken with a fatty meal (e.g., ice cream or french fries).
Oxybutynin Counseling:  I discussed with the patient the risks of oxybutynin including but not limited to skin rash, drowsiness, dry mouth, difficulty urinating, and blurred vision.
Cibinqo Counseling: I discussed with the patient the risks of Cibinqo therapy including but not limited to common cold, nausea, headache, cold sores, increased blood CPK levels, dizziness, UTIs, fatigue, acne, and vomitting. Live vaccines should be avoided.  This medication has been linked to serious infections; higher rate of mortality; malignancy and lymphoproliferative disorders; major adverse cardiovascular events; thrombosis; thrombocytopenia and lymphopenia; lipid elevations; and retinal detachment.
Xolair Counseling:  Patient informed of potential adverse effects including but not limited to fever, muscle aches, rash and allergic reactions.  The patient verbalized understanding of the proper use and possible adverse effects of Xolair.  All of the patient's questions and concerns were addressed.
Niacinamide Pregnancy And Lactation Text: These medications are considered safe during pregnancy.
High Dose Vitamin A Counseling: Side effects reviewed, pt to contact office should one occur.
Rifampin Counseling: I discussed with the patient the risks of rifampin including but not limited to liver damage, kidney damage, red-orange body fluids, nausea/vomiting and severe allergy.
Gabapentin Counseling: I discussed with the patient the risks of gabapentin including but not limited to dizziness, somnolence, fatigue and ataxia.
Cosentyx Counseling:  I discussed with the patient the risks of Cosentyx including but not limited to worsening of Crohn's disease, immunosuppression, allergic reactions and infections.  The patient understands that monitoring is required including a PPD at baseline and must alert us or the primary physician if symptoms of infection or other concerning signs are noted.
Doxycycline Pregnancy And Lactation Text: This medication is Pregnancy Category D and not consider safe during pregnancy. It is also excreted in breast milk but is considered safe for shorter treatment courses.
Winlevi Pregnancy And Lactation Text: This medication is considered safe during pregnancy and breastfeeding.
Qbrexza Counseling:  I discussed with the patient the risks of Qbrexza including but not limited to headache, mydriasis, blurred vision, dry eyes, nasal dryness, dry mouth, dry throat, dry skin, urinary hesitation, and constipation.  Local skin reactions including erythema, burning, stinging, and itching can also occur.
Dutasteride Pregnancy And Lactation Text: This medication is absolutely contraindicated in women, especially during pregnancy and breast feeding. Feminization of male fetuses is possible if taking while pregnant.
Soolantra Pregnancy And Lactation Text: This medication is Pregnancy Category C. This medication is considered safe during breast feeding.
Azithromycin Counseling:  I discussed with the patient the risks of azithromycin including but not limited to GI upset, allergic reaction, drug rash, diarrhea, and yeast infections.
Xeljanz Counseling: I discussed with the patient the risks of Xeljanz therapy including increased risk of infection, liver issues, headache, diarrhea, or cold symptoms. Live vaccines should be avoided. They were instructed to call if they have any problems.
Eucrisa Counseling: Patient may experience a mild burning sensation during topical application. Eucrisa is not approved in children less than 3 months of age.
Topical Metronidazole Counseling: Metronidazole is a topical antibiotic medication. You may experience burning, stinging, redness, or allergic reactions.  Please call our office if you develop any problems from using this medication.
Griseofulvin Pregnancy And Lactation Text: This medication is Pregnancy Category X and is known to cause serious birth defects. It is unknown if this medication is excreted in breast milk but breast feeding should be avoided.
Cellcept Counseling:  I discussed with the patient the risks of mycophenolate mofetil including but not limited to infection/immunosuppression, GI upset, hypokalemia, hypercholesterolemia, bone marrow suppression, lymphoproliferative disorders, malignancy, GI ulceration/bleed/perforation, colitis, interstitial lung disease, kidney failure, progressive multifocal leukoencephalopathy, and birth defects.  The patient understands that monitoring is required including a baseline creatinine and regular CBC testing. In addition, patient must alert us immediately if symptoms of infection or other concerning signs are noted.
Cibinqo Pregnancy And Lactation Text: It is unknown if this medication will adversely affect pregnancy or breast feeding.  You should not take this medication if you are currently pregnant or planning a pregnancy or while breastfeeding.
Xolair Pregnancy And Lactation Text: This medication is Pregnancy Category B and is considered safe during pregnancy. This medication is excreted in breast milk.
Nsaids Counseling: NSAID Counseling: I discussed with the patient that NSAIDs should be taken with food. Prolonged use of NSAIDs can result in the development of stomach ulcers.  Patient advised to stop taking NSAIDs if abdominal pain occurs.  The patient verbalized understanding of the proper use and possible adverse effects of NSAIDs.  All of the patient's questions and concerns were addressed.
Skyrizi Counseling: I discussed with the patient the risks of risankizumab-rzaa including but not limited to immunosuppression, and serious infections.  The patient understands that monitoring is required including a PPD at baseline and must alert us or the primary physician if symptoms of infection or other concerning signs are noted.
Ivermectin Counseling:  Patient instructed to take medication on an empty stomach with a full glass of water.  Patient informed of potential adverse effects including but not limited to nausea, diarrhea, dizziness, itching, and swelling of the extremities or lymph nodes.  The patient verbalized understanding of the proper use and possible adverse effects of ivermectin.  All of the patient's questions and concerns were addressed.
Infliximab Counseling:  I discussed with the patient the risks of infliximab including but not limited to myelosuppression, immunosuppression, autoimmune hepatitis, demyelinating diseases, lymphoma, and serious infections.  The patient understands that monitoring is required including a PPD at baseline and must alert us or the primary physician if symptoms of infection or other concerning signs are noted.
Rifampin Pregnancy And Lactation Text: This medication is Pregnancy Category C and it isn't know if it is safe during pregnancy. It is also excreted in breast milk and should not be used if you are breast feeding.
High Dose Vitamin A Pregnancy And Lactation Text: High dose vitamin A therapy is contraindicated during pregnancy and breast feeding.

## 2024-01-08 NOTE — PROCEDURE: PRESCRIPTION MEDICATION MANAGEMENT
Detail Level: Zone
Render In Strict Bullet Format?: No
Initiate Treatment: hydrocortisone 2.5 % topical ointment: Apply to the affected areas of the face BID x 2-3 weeks

## 2024-01-08 NOTE — PROCEDURE: BIOPSY BY SHAVE METHOD
Detail Level: Detailed
Depth Of Biopsy: dermis
Was A Bandage Applied: Yes
Size Of Lesion In Cm: 0
Biopsy Type: H and E
Biopsy Method: Dermablade
Anesthesia Type: 1% lidocaine with epinephrine
Anesthesia Volume In Cc: 0.5
Hemostasis: Electrocautery
Wound Care: Petrolatum
Dressing: bandage
Destruction After The Procedure: No
Type Of Destruction Used: Curettage
Curettage Text: The wound bed was treated with curettage after the biopsy was performed.
Cryotherapy Text: The wound bed was treated with cryotherapy after the biopsy was performed.
Electrodesiccation Text: The wound bed was treated with electrodesiccation after the biopsy was performed.
Electrodesiccation And Curettage Text: The wound bed was treated with electrodesiccation and curettage after the biopsy was performed.
Silver Nitrate Text: The wound bed was treated with silver nitrate after the biopsy was performed.
Lab: 6
Consent: Written consent was obtained and risks were reviewed including but not limited to scarring, infection, bleeding, scabbing, incomplete removal, nerve damage and allergy to anesthesia.
Post-Care Instructions: I reviewed with the patient in detail post-care instructions. Patient is to keep the biopsy site dry overnight, and then apply bacitracin twice daily until healed. Patient may apply hydrogen peroxide soaks to remove any crusting.
Notification Instructions: Patient will be notified of biopsy results. However, patient instructed to call the office if not contacted within 2 weeks.
Billing Type: Third-Party Bill
Information: Selecting Yes will display possible errors in your note based on the variables you have selected. This validation is only offered as a suggestion for you. PLEASE NOTE THAT THE VALIDATION TEXT WILL BE REMOVED WHEN YOU FINALIZE YOUR NOTE. IF YOU WANT TO FAX A PRELIMINARY NOTE YOU WILL NEED TO TOGGLE THIS TO 'NO' IF YOU DO NOT WANT IT IN YOUR FAXED NOTE.

## 2024-04-08 ENCOUNTER — APPOINTMENT (RX ONLY)
Dept: URBAN - METROPOLITAN AREA CLINIC 374 | Facility: CLINIC | Age: 74
Setting detail: DERMATOLOGY
End: 2024-04-08

## 2024-04-08 DIAGNOSIS — L81.4 OTHER MELANIN HYPERPIGMENTATION: ICD-10-CM | Status: UNCHANGED

## 2024-04-08 DIAGNOSIS — Z71.89 OTHER SPECIFIED COUNSELING: ICD-10-CM

## 2024-04-08 DIAGNOSIS — L81.5 LEUKODERMA, NOT ELSEWHERE CLASSIFIED: ICD-10-CM | Status: UNCHANGED

## 2024-04-08 DIAGNOSIS — L82.1 OTHER SEBORRHEIC KERATOSIS: ICD-10-CM | Status: UNCHANGED

## 2024-04-08 DIAGNOSIS — Z87.2 PERSONAL HISTORY OF DISEASES OF THE SKIN AND SUBCUTANEOUS TISSUE: ICD-10-CM

## 2024-04-08 DIAGNOSIS — D18.0 HEMANGIOMA: ICD-10-CM | Status: UNCHANGED

## 2024-04-08 DIAGNOSIS — D22 MELANOCYTIC NEVI: ICD-10-CM | Status: UNCHANGED

## 2024-04-08 PROBLEM — D18.01 HEMANGIOMA OF SKIN AND SUBCUTANEOUS TISSUE: Status: ACTIVE | Noted: 2024-04-08

## 2024-04-08 PROBLEM — D23.71 OTHER BENIGN NEOPLASM OF SKIN OF RIGHT LOWER LIMB, INCLUDING HIP: Status: ACTIVE | Noted: 2024-04-08

## 2024-04-08 PROBLEM — D22.5 MELANOCYTIC NEVI OF TRUNK: Status: ACTIVE | Noted: 2024-04-08

## 2024-04-08 PROCEDURE — ? COUNSELING

## 2024-04-08 PROCEDURE — ? ADDITIONAL NOTES

## 2024-04-08 PROCEDURE — 99213 OFFICE O/P EST LOW 20 MIN: CPT

## 2024-04-08 PROCEDURE — ? FULL BODY SKIN EXAM

## 2024-04-08 PROCEDURE — ? PHOTO-DOCUMENTATION

## 2024-04-08 ASSESSMENT — LOCATION ZONE DERM
LOCATION ZONE: FACE
LOCATION ZONE: LEG
LOCATION ZONE: HAND
LOCATION ZONE: ARM
LOCATION ZONE: TRUNK

## 2024-04-08 ASSESSMENT — LOCATION SIMPLE DESCRIPTION DERM
LOCATION SIMPLE: LEFT HAND
LOCATION SIMPLE: RIGHT HAND
LOCATION SIMPLE: LEFT UPPER BACK
LOCATION SIMPLE: RIGHT UPPER BACK
LOCATION SIMPLE: LEFT PRETIBIAL REGION
LOCATION SIMPLE: RIGHT FOREARM
LOCATION SIMPLE: LEFT ZYGOMA
LOCATION SIMPLE: LEFT FOREARM
LOCATION SIMPLE: UPPER BACK
LOCATION SIMPLE: LEFT CHEEK
LOCATION SIMPLE: ABDOMEN
LOCATION SIMPLE: RIGHT PRETIBIAL REGION

## 2024-04-08 ASSESSMENT — LOCATION DETAILED DESCRIPTION DERM
LOCATION DETAILED: INFERIOR THORACIC SPINE
LOCATION DETAILED: RIGHT PROXIMAL PRETIBIAL REGION
LOCATION DETAILED: LEFT DISTAL DORSAL FOREARM
LOCATION DETAILED: LEFT PROXIMAL PRETIBIAL REGION
LOCATION DETAILED: EPIGASTRIC SKIN
LOCATION DETAILED: LEFT INFERIOR MEDIAL MALAR CHEEK
LOCATION DETAILED: RIGHT DISTAL DORSAL FOREARM
LOCATION DETAILED: LEFT LATERAL ZYGOMA
LOCATION DETAILED: LEFT RADIAL DORSAL HAND
LOCATION DETAILED: RIGHT RADIAL DORSAL HAND
LOCATION DETAILED: RIGHT MEDIAL UPPER BACK
LOCATION DETAILED: LEFT MID-UPPER BACK
LOCATION DETAILED: SUPERIOR THORACIC SPINE

## 2024-04-08 NOTE — PROCEDURE: ADDITIONAL NOTES
Additional Notes: History of junctional melanocytic nevus, moderately atypical on the left mid upper back status post skin biopsy obtained by Migdalia Li PA-C on 10/27/20 and status post excision performed by Migdalia Li on 12/01/20
Render Risk Assessment In Note?: no
Detail Level: Detailed

## 2024-06-20 NOTE — PROCEDURE: COUNSELING
Administrations This Visit       ALLERGEN EXTRACT 0.3 mL       Admin Date  06/20/2024 Action  Given Dose  0.3 mL Route  SubCUTAneous Administered By  Elizabet Duarte MA      Admin Date  06/20/2024 Action  Given Dose  0.3 mL Route  SubCUTAneous Administered By  Elizabet Duarte MA                  After obtaining consent, and per orders of Naya Momin APRN - NP, the following injections were administered:    --- Allergen Extract 0.30 mL from vial 1 was given in the left arm subcutaneously by Elizabet Duarte MA.   --- Allergen Extract 0.30 mL from vial 2 was given in the right arm subcutaneously by Elizabet Duarte MA.     The patient has been instructed to remain in clinic for 20 minutes afterwards, and to report any adverse reaction to me immediately.       
Detail Level: Detailed
Patient Specific Counseling (Will Not Stick From Patient To Patient): Avoidance of custom jewelries

## 2025-07-29 ENCOUNTER — PRE-ADMISSION TESTING (OUTPATIENT)
Dept: PREADMISSION TESTING | Facility: HOSPITAL | Age: 75
End: 2025-07-29
Attending: ORTHOPAEDIC SURGERY
Payer: COMMERCIAL

## 2025-07-29 ENCOUNTER — APPOINTMENT (OUTPATIENT)
Dept: LAB | Facility: HOSPITAL | Age: 75
End: 2025-07-29
Attending: ORTHOPAEDIC SURGERY
Payer: COMMERCIAL

## 2025-07-29 VITALS
OXYGEN SATURATION: 97 % | HEIGHT: 66 IN | WEIGHT: 159 LBS | HEART RATE: 70 BPM | SYSTOLIC BLOOD PRESSURE: 119 MMHG | TEMPERATURE: 97.1 F | BODY MASS INDEX: 25.55 KG/M2 | DIASTOLIC BLOOD PRESSURE: 56 MMHG | RESPIRATION RATE: 16 BRPM

## 2025-07-29 DIAGNOSIS — Z01.818 PRE-OP EVALUATION: Primary | Chronic | ICD-10-CM

## 2025-07-29 DIAGNOSIS — C92.01 ACUTE MYELOID LEUKEMIA IN REMISSION (CMS/HCC): Chronic | ICD-10-CM

## 2025-07-29 DIAGNOSIS — N18.31 CKD STAGE 3A, GFR 45-59 ML/MIN (CMS/HCC): Chronic | ICD-10-CM

## 2025-07-29 DIAGNOSIS — Z87.39 HISTORY OF OSTEOPOROSIS: Chronic | ICD-10-CM

## 2025-07-29 DIAGNOSIS — Z98.890 PERSONAL HISTORY OF SURGERY TO HEART AND GREAT VESSELS, PRESENTING HAZARDS TO HEALTH: ICD-10-CM

## 2025-07-29 DIAGNOSIS — R55 VASOVAGAL SYNCOPE: Chronic | ICD-10-CM

## 2025-07-29 LAB
ABO + RH BLD: NORMAL
ALBUMIN SERPL-MCNC: 4.4 G/DL (ref 3.5–5.7)
ALP SERPL-CCNC: 60 IU/L (ref 34–125)
ALT SERPL-CCNC: 11 IU/L (ref 7–52)
ANION GAP SERPL CALC-SCNC: 5 MEQ/L (ref 3–15)
APTT PPP: 30 SEC (ref 23–35)
AST SERPL-CCNC: 18 IU/L (ref 13–39)
BASOPHILS # BLD: 0.03 K/UL (ref 0.01–0.1)
BASOPHILS NFR BLD: 0.5 %
BILIRUB SERPL-MCNC: 0.5 MG/DL (ref 0.3–1.2)
BLD GP AB SCN SERPL QL: NEGATIVE
BLOOD BANK CMNT PATIENT-IMP: NORMAL
BUN SERPL-MCNC: 13 MG/DL (ref 7–25)
CALCIUM SERPL-MCNC: 9.6 MG/DL (ref 8.6–10.3)
CHLORIDE SERPL-SCNC: 105 MEQ/L (ref 98–107)
CO2 SERPL-SCNC: 29 MEQ/L (ref 21–31)
CREAT SERPL-MCNC: 1 MG/DL (ref 0.6–1.2)
D AG BLD QL: NEGATIVE
DIFFERENTIAL METHOD BLD: ABNORMAL
EGFRCR SERPLBLD CKD-EPI 2021: 58.9 ML/MIN/1.73M*2
EOSINOPHIL # BLD: 0.21 K/UL (ref 0.04–0.36)
EOSINOPHIL NFR BLD: 3.4 %
ERYTHROCYTE [DISTWIDTH] IN BLOOD BY AUTOMATED COUNT: 14.4 % (ref 11.7–14.4)
EST. AVERAGE GLUCOSE BLD GHB EST-MCNC: 111 MG/DL
GLUCOSE SERPL-MCNC: 152 MG/DL (ref 70–99)
HBA1C MFR BLD: 5.5 %
HCT VFR BLD AUTO: 42.3 % (ref 35–45)
HGB BLD-MCNC: 13.5 G/DL (ref 11.8–15.7)
IMM GRANULOCYTES # BLD AUTO: 0.01 K/UL (ref 0–0.08)
IMM GRANULOCYTES NFR BLD AUTO: 0.2 %
INR PPP: 1.1
LABORATORY COMMENT REPORT: NORMAL
LYMPHOCYTES # BLD: 2.06 K/UL (ref 1.2–3.5)
LYMPHOCYTES NFR BLD: 33 %
MCH RBC QN AUTO: 31.3 PG (ref 28–33.2)
MCHC RBC AUTO-ENTMCNC: 31.9 G/DL (ref 32.2–35.5)
MCV RBC AUTO: 97.9 FL (ref 83–98)
MONOCYTES # BLD: 0.49 K/UL (ref 0.28–0.8)
MONOCYTES NFR BLD: 7.9 %
NEUTROPHILS # BLD: 3.44 K/UL (ref 1.7–7)
NEUTS SEG NFR BLD: 55 %
NRBC BLD-RTO: 0 %
PLATELET # BLD AUTO: 241 K/UL (ref 150–369)
PMV BLD AUTO: 9 FL (ref 9.4–12.3)
POTASSIUM SERPL-SCNC: 4.6 MEQ/L (ref 3.5–5.1)
PROT SERPL-MCNC: 7.1 G/DL (ref 6–8.2)
PROTHROMBIN TIME: 13.7 SEC (ref 12.2–14.5)
RBC # BLD AUTO: 4.32 M/UL (ref 3.93–5.22)
SODIUM SERPL-SCNC: 139 MEQ/L (ref 136–145)
SPECIMEN EXP DATE BLD: NORMAL
WBC # BLD AUTO: 6.24 K/UL (ref 3.8–10.5)

## 2025-07-29 PROCEDURE — 99204 OFFICE O/P NEW MOD 45 MIN: CPT | Performed by: INTERNAL MEDICINE

## 2025-07-29 PROCEDURE — 80053 COMPREHEN METABOLIC PANEL: CPT

## 2025-07-29 PROCEDURE — 83036 HEMOGLOBIN GLYCOSYLATED A1C: CPT

## 2025-07-29 PROCEDURE — 85610 PROTHROMBIN TIME: CPT

## 2025-07-29 PROCEDURE — 86850 RBC ANTIBODY SCREEN: CPT

## 2025-07-29 PROCEDURE — 36415 COLL VENOUS BLD VENIPUNCTURE: CPT

## 2025-07-29 PROCEDURE — 85025 COMPLETE CBC W/AUTO DIFF WBC: CPT

## 2025-07-29 PROCEDURE — 85730 THROMBOPLASTIN TIME PARTIAL: CPT

## 2025-07-29 NOTE — CONSULTS
Division of [facility] Medicine -  Pre-Operative Consultation       Patient Name: Dorothy Hernandez  Referring Surgeon: Marco Manzano DO     Reason for Referral: Pre-Operative Evaluation  Surgical Procedure:  total left knee arthroplasty   Operative Date:  8/1/2025  Other Providers:      PCP: Price Erazo MD           HISTORY OF PRESENT ILLNESS      Dorothy Hernandez is a 75 y.o. female presenting today to the [facility] at  for pre-operative evaluation prior to planned surgery.              In regards to medical history:  Ms. Hernandez has history of Hypothyroidism, migraines, diverticulitis she has history of AML and was treated with chemotherapy since 2021, diagnosed 3/2021 and now achieved remission. 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 Monitor wbc, look for anemia keep>7, Platelets. She has osteoporosis, receiving Zometa infusions. Oncology recommended aspirin and a A/C post surgically. She will see oncology in 3 months. 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 Monitor renal function post surgically, avoid NSAIDs,avoid hypotension and avoid anemia. Patient able to ambulate for    with chest pain, sob, no fevers, no chills or rigors.     The patient denies any current or recent chest pain or pressure, dyspnea, cough, sputum, fevers, chills, abdominal pain, nausea, vomiting, diarrhea or other symptoms.     Functionally, the patient is able to ascend a flight or so of stairs with no dyspnea or chest pain.     The patient denies, on specific questioning, the following:  No history of MI, arrhythmia,or CHF.  No history of MYRA.  No history of DVT/PE.  No history of COPD.  No history of CVA.  No history of DM.   No history of CKD.     PAST MEDICAL AND SURGICAL HISTORY      Past Medical History:   Diagnosis Date    AML (acute myeloblastic leukemia) (CMS/HCC)     in remission    Hypothyroidism     MVP  "(mitral valve prolapse)     Seasonal allergies        Past Surgical History   Procedure Laterality Date    Appendectomy      Tonsillectomy      Tubal ligation         MEDICATIONS        Current Outpatient Medications:     famotidine (PEPCID) 20 mg tablet, Take 20 mg by mouth 2 (two) times a day., Disp: , Rfl:     levothyroxine (SYNTHROID) 125 mcg tablet, Take 125 mcg by mouth daily., Disp: , Rfl:     NOT IN DATABASE, Calcium and D Zatador eye drops OU  Refresh eye drops OU Lactaid PRN, Disp: , Rfl:     rosuvastatin (CRESTOR) 10 mg tablet, Take 10 mg by mouth nightly., Disp: , Rfl:     TIBSOVO 250 mg tablet, Take 250 mg by mouth daily before lunch., Disp: , Rfl:     valACYclovir (VALTREX) 500 mg tablet, Take 500 mg by mouth 2 (two) times a day., Disp: , Rfl:     ALLERGIES      Levaquin [levofloxacin], Sulfa (sulfonamide antibiotics), and Voriconazole    FAMILY HISTORY      family history is not on file.    Denies any prior known family history of DVTs/PEs/clotting disorder    SOCIAL HISTORY      Social History     Tobacco Use    Smoking status: Former     Types: Cigarettes    Smokeless tobacco: Never    Tobacco comments:     Quit 1970's   Substance Use Topics    Alcohol use: Never    Drug use: Never       REVIEW OF SYSTEMS      All other systems reviewed and negative except as noted in HPI    PHYSICAL EXAMINATION      Visit Vitals  BP (!) 119/56 (BP Location: Left upper arm, Patient Position: Sitting)   Pulse 70   Temp 36.2 °C (97.1 °F) (Temporal)   Resp 16   Ht 1.676 m (5' 6\")   Wt 72.1 kg (159 lb)   SpO2 97%   BMI 25.66 kg/m²     Body mass index is 25.66 kg/m².    Physical Exam  Vitals and nursing note reviewed.   Constitutional:       General: She is not in acute distress.     Appearance: She is not ill-appearing, toxic-appearing or diaphoretic.   Cardiovascular:      Rate and Rhythm: Normal rate and regular rhythm.      Heart sounds: No murmur heard.     No friction rub. No gallop.   Pulmonary:      Effort: " Pulmonary effort is normal. No respiratory distress.      Breath sounds: Normal breath sounds. No stridor. No wheezing.   Abdominal:      General: Abdomen is flat. Bowel sounds are normal. There is no distension.      Palpations: Abdomen is soft. There is no mass.      Tenderness: There is no abdominal tenderness.      Hernia: No hernia is present.   Musculoskeletal:         General: No swelling, tenderness, deformity or signs of injury. Normal range of motion.   Skin:     General: Skin is warm and dry.      Coloration: Skin is not jaundiced or pale.      Findings: No bruising or erythema.   Neurological:      General: No focal deficit present.      Mental Status: She is alert and oriented to person, place, and time.      Cranial Nerves: No cranial nerve deficit.      Sensory: No sensory deficit.      Motor: No weakness.      Coordination: Coordination normal.   Psychiatric:         Mood and Affect: Mood normal.         Behavior: Behavior normal.         LABS / EKG        Labs  I have reviewed the patient's labs to the time of note. No new clinical concern.    Lab Results   Component Value Date     07/29/2025    K 4.6 07/29/2025     07/29/2025    BUN 13 07/29/2025    CREATININE 1.0 07/29/2025    WBC 6.24 07/29/2025    HGB 13.5 07/29/2025    HCT 42.3 07/29/2025     07/29/2025    ALT 11 07/29/2025    AST 18 07/29/2025    INR 1.1 07/29/2025    HGBA1C 5.5 07/29/2025         ECG/Telemetry  I have independently reviewed the ECG. No significant findings.    ASSESSMENT AND PLAN         CKD stage 3a, GFR 45-59 ml/min (CMS/[facility])  Follows with nephrology, she has CKD 2/3a, age and nephrosclerosis  Follow up outpatient   Monitor bmp  Avoid nephrotoxic medications/NSAIDs  Avoid anemia, keep Hg>7  Avoid hypotension    Vasovagal syncope  Patient has multiple episodes of vasovagal syncope in the past  Avoid pain, stress as much as possible  Fall precautions  Ambulate to bathroom and monitor closely, do not leave  her alone while using rest room     Acute myeloid leukemia in remission (CMS/HCC)  Diagnosed in 2021, she has allogenic stem cell transplant, now in remission  She is on Ivosidenib (TIBSOVO 250 mg a day) daily (nonformulary)  She will bring the medication from home, so resume on the day post surgically  Monitor cbc look for wbc count, rbc transfuse to keep>7 and platelets  Her oncologist at [facility] recommends she needs to be on Aspirin and DVT anticoagulation post knee surgery   Follow up with oncology 3 months           History of osteoporosis  She said needs to get zometa outpatient could not go as it was set for Saturday  Might need zometa infusion     Pre-op evaluation  Patient has no CAD, No CVA, no COPD, Not on insulin, CKD but creatinine is not >2, her major cardiac risk is 0.4%. She is moderate risk for moderate risk procedure, would move forward as planned  -early extubation  -fall precautions  -pain control  -DVT prophylaxis post surgically  -monitor cbc and bmp        In regards to perioperative cardiac risk:  Regarding perioperative cardiovascular risk:  The patient has the following risk factors: none.  This correlates to a rCRI score of 0 with perioperative cardiac event risk of 0.4%.      Further comments:  Resume supplements when OK with surgical team.  I would encourage incentive spirometry to assist with minimizing denisa-operative pulmonary risk.  DVT prophylaxis and timing of such per the discretion of the surgeon.     Please do not hesitate to contact [facility] during the upcoming hospitalization with any questions or concerns.     Benjamin Tejada MD  7/31/2025

## 2025-07-29 NOTE — ASSESSMENT & PLAN NOTE
Patient has multiple episodes of vasovagal syncope in the past  Avoid pain, stress as much as possible  Fall precautions  Ambulate to bathroom and monitor closely, do not leave her alone while using rest room

## 2025-07-29 NOTE — ASSESSMENT & PLAN NOTE
Patient has no CAD, No CVA, no COPD, Not on insulin, CKD but creatinine is not >2, her major cardiac risk is 0.4%. She is moderate risk for moderate risk procedure, would move forward as planned  -early extubation  -fall precautions  -pain control  -DVT prophylaxis post surgically  -monitor cbc and bmp

## 2025-07-29 NOTE — ASSESSMENT & PLAN NOTE
She said needs to get zometa outpatient could not go as it was set for Saturday  Might need zometa infusion

## 2025-07-29 NOTE — ASSESSMENT & PLAN NOTE
Follows with nephrology, she has CKD 2/3a, age and nephrosclerosis  Follow up outpatient   Monitor bmp  Avoid nephrotoxic medications/NSAIDs  Avoid anemia, keep Hg>7  Avoid hypotension

## 2025-07-29 NOTE — ASSESSMENT & PLAN NOTE
Diagnosed in 2021, she has allogenic stem cell transplant, now in remission  She is on Ivosidenib (TIBSOVO 250 mg a day) daily (nonformulary)  She will bring the medication from home, so resume on the day post surgically  Monitor cbc look for wbc count, rbc transfuse to keep>7 and platelets  Her oncologist at [facility] recommends she needs to be on Aspirin and DVT anticoagulation post knee surgery   Follow up with oncology 3 months

## 2025-07-30 ENCOUNTER — ANESTHESIA EVENT (OUTPATIENT)
Dept: OPERATING ROOM | Facility: HOSPITAL | Age: 75
Setting detail: HOSPITAL OUTPATIENT SURGERY
DRG: 470 | End: 2025-07-30
Payer: COMMERCIAL

## 2025-08-01 ENCOUNTER — ANESTHESIA (OUTPATIENT)
Dept: OPERATING ROOM | Facility: HOSPITAL | Age: 75
Setting detail: HOSPITAL OUTPATIENT SURGERY
DRG: 470 | End: 2025-08-01
Payer: COMMERCIAL

## 2025-08-01 ENCOUNTER — HOSPITAL ENCOUNTER (INPATIENT)
Facility: HOSPITAL | Age: 75
Discharge: NURSING, REHAB OR LONG-TERM CARE | DRG: 470 | End: 2025-08-01
Attending: ORTHOPAEDIC SURGERY | Admitting: ORTHOPAEDIC SURGERY
Payer: COMMERCIAL

## 2025-08-01 ENCOUNTER — APPOINTMENT (OUTPATIENT)
Dept: RADIOLOGY | Facility: HOSPITAL | Age: 75
Setting detail: HOSPITAL OUTPATIENT SURGERY
DRG: 470 | End: 2025-08-01
Attending: ORTHOPAEDIC SURGERY
Payer: COMMERCIAL

## 2025-08-01 DIAGNOSIS — Z96.652 S/P TKR (TOTAL KNEE REPLACEMENT), LEFT: ICD-10-CM

## 2025-08-01 DIAGNOSIS — Z01.818 PRE-OP EVALUATION: Primary | Chronic | ICD-10-CM

## 2025-08-01 PROBLEM — E78.2 MIXED HYPERLIPIDEMIA: Status: ACTIVE | Noted: 2025-08-01

## 2025-08-01 PROBLEM — N18.31 STAGE 3A CHRONIC KIDNEY DISEASE (CMS/HCC): Status: ACTIVE | Noted: 2025-07-29

## 2025-08-01 PROBLEM — E03.9 HYPOTHYROIDISM: Status: ACTIVE | Noted: 2025-08-01

## 2025-08-01 LAB
ABO + RH BLD: NORMAL
D AG BLD QL: NEGATIVE
GLUCOSE BLD-MCNC: 180 MG/DL (ref 70–99)
POCT TEST: ABNORMAL

## 2025-08-01 PROCEDURE — 93005 ELECTROCARDIOGRAM TRACING: CPT | Performed by: ORTHOPAEDIC SURGERY

## 2025-08-01 PROCEDURE — 25800000 HC PHARMACY IV SOLUTIONS: Performed by: ORTHOPAEDIC SURGERY

## 2025-08-01 PROCEDURE — 63600000 HC DRUGS/DETAIL CODE: Mod: JZ | Performed by: ORTHOPAEDIC SURGERY

## 2025-08-01 PROCEDURE — 36000006 HC OR LEVEL 6 INITIAL 30MIN: Performed by: ORTHOPAEDIC SURGERY

## 2025-08-01 PROCEDURE — 85027 COMPLETE CBC AUTOMATED: CPT | Performed by: STUDENT IN AN ORGANIZED HEALTH CARE EDUCATION/TRAINING PROGRAM

## 2025-08-01 PROCEDURE — 63600000 HC DRUGS/DETAIL CODE: Mod: JZ | Performed by: ANESTHESIOLOGY

## 2025-08-01 PROCEDURE — 25000000 HC PHARMACY GENERAL: Performed by: REGISTERED NURSE

## 2025-08-01 PROCEDURE — 63700000 HC SELF-ADMINISTRABLE DRUG: Performed by: ORTHOPAEDIC SURGERY

## 2025-08-01 PROCEDURE — 82310 ASSAY OF CALCIUM: CPT | Performed by: STUDENT IN AN ORGANIZED HEALTH CARE EDUCATION/TRAINING PROGRAM

## 2025-08-01 PROCEDURE — 0SRD0JZ REPLACEMENT OF LEFT KNEE JOINT WITH SYNTHETIC SUBSTITUTE, OPEN APPROACH: ICD-10-PCS | Performed by: ORTHOPAEDIC SURGERY

## 2025-08-01 PROCEDURE — 71000001 HC PACU PHASE 1 INITIAL 30MIN: Performed by: ORTHOPAEDIC SURGERY

## 2025-08-01 PROCEDURE — C1713 ANCHOR/SCREW BN/BN,TIS/BN: HCPCS | Performed by: ORTHOPAEDIC SURGERY

## 2025-08-01 PROCEDURE — 73560 X-RAY EXAM OF KNEE 1 OR 2: CPT | Mod: LT

## 2025-08-01 PROCEDURE — 37000010 HC ANESTHESIA SPINAL: Performed by: ORTHOPAEDIC SURGERY

## 2025-08-01 PROCEDURE — 63600000 HC DRUGS/DETAIL CODE: Performed by: REGISTERED NURSE

## 2025-08-01 PROCEDURE — 63700000 HC SELF-ADMINISTRABLE DRUG: Performed by: NURSE PRACTITIONER

## 2025-08-01 PROCEDURE — 63600000 HC DRUGS/DETAIL CODE: Mod: JZ | Performed by: REGISTERED NURSE

## 2025-08-01 PROCEDURE — 200200 PR NO CHARGE: Performed by: HOSPITALIST

## 2025-08-01 PROCEDURE — 97530 THERAPEUTIC ACTIVITIES: CPT | Mod: GP,U8

## 2025-08-01 PROCEDURE — 36000016 HC OR LEVEL 6 EA ADDL MIN: Performed by: ORTHOPAEDIC SURGERY

## 2025-08-01 PROCEDURE — 63600000 HC DRUGS/DETAIL CODE: Mod: JZ | Performed by: NURSE PRACTITIONER

## 2025-08-01 PROCEDURE — 71000011 HC PACU PHASE 1 EA ADDL MIN: Performed by: ORTHOPAEDIC SURGERY

## 2025-08-01 PROCEDURE — 27200000 HC STERILE SUPPLY: Performed by: ORTHOPAEDIC SURGERY

## 2025-08-01 PROCEDURE — 97162 PT EVAL MOD COMPLEX 30 MIN: CPT | Mod: GP,U8

## 2025-08-01 PROCEDURE — 36415 COLL VENOUS BLD VENIPUNCTURE: CPT | Performed by: ORTHOPAEDIC SURGERY

## 2025-08-01 PROCEDURE — C1776 JOINT DEVICE (IMPLANTABLE): HCPCS | Performed by: ORTHOPAEDIC SURGERY

## 2025-08-01 PROCEDURE — 99232 SBSQ HOSP IP/OBS MODERATE 35: CPT | Performed by: HOSPITALIST

## 2025-08-01 PROCEDURE — 99999 PR OFFICE/OUTPT VISIT,PROCEDURE ONLY: CPT | Performed by: STUDENT IN AN ORGANIZED HEALTH CARE EDUCATION/TRAINING PROGRAM

## 2025-08-01 PROCEDURE — 25800000 HC PHARMACY IV SOLUTIONS: Performed by: REGISTERED NURSE

## 2025-08-01 DEVICE — PSN TIB POR 0 DEG SPK KEL F LT: Type: IMPLANTABLE DEVICE | Site: KNEE | Status: FUNCTIONAL

## 2025-08-01 DEVICE — PSN MC VE ASF L 10MM 8-11 EF: Type: IMPLANTABLE DEVICE | Site: KNEE | Status: FUNCTIONAL

## 2025-08-01 DEVICE — IMPLANTABLE DEVICE: Type: IMPLANTABLE DEVICE | Site: KNEE | Status: FUNCTIONAL

## 2025-08-01 RX ORDER — PROPOFOL 10 MG/ML
INJECTION, EMULSION INTRAVENOUS AS NEEDED
Status: DISCONTINUED | OUTPATIENT
Start: 2025-08-01 | End: 2025-08-01 | Stop reason: SURG

## 2025-08-01 RX ORDER — FENTANYL CITRATE 50 UG/ML
INJECTION, SOLUTION INTRAMUSCULAR; INTRAVENOUS AS NEEDED
Status: DISCONTINUED | OUTPATIENT
Start: 2025-08-01 | End: 2025-08-01 | Stop reason: SURG

## 2025-08-01 RX ORDER — DEXAMETHASONE SODIUM PHOSPHATE 4 MG/ML
INJECTION, SOLUTION INTRA-ARTICULAR; INTRALESIONAL; INTRAMUSCULAR; INTRAVENOUS; SOFT TISSUE AS NEEDED
Status: DISCONTINUED | OUTPATIENT
Start: 2025-08-01 | End: 2025-08-01 | Stop reason: SURG

## 2025-08-01 RX ORDER — HYDROMORPHONE HYDROCHLORIDE 1 MG/ML
0.5 INJECTION, SOLUTION INTRAMUSCULAR; INTRAVENOUS; SUBCUTANEOUS EVERY 4 HOURS PRN
Status: DISCONTINUED | OUTPATIENT
Start: 2025-08-01 | End: 2025-08-06 | Stop reason: HOSPADM

## 2025-08-01 RX ORDER — SODIUM CHLORIDE 9 MG/ML
INJECTION, SOLUTION INTRAVENOUS CONTINUOUS PRN
Status: DISCONTINUED | OUTPATIENT
Start: 2025-08-01 | End: 2025-08-01 | Stop reason: SURG

## 2025-08-01 RX ORDER — VALACYCLOVIR HYDROCHLORIDE 500 MG/1
500 TABLET, FILM COATED ORAL 2 TIMES DAILY
Status: DISCONTINUED | OUTPATIENT
Start: 2025-08-01 | End: 2025-08-06 | Stop reason: HOSPADM

## 2025-08-01 RX ORDER — ASPIRIN 81 MG/1
81 TABLET ORAL 2 TIMES DAILY
Status: DISCONTINUED | OUTPATIENT
Start: 2025-08-01 | End: 2025-08-06 | Stop reason: HOSPADM

## 2025-08-01 RX ORDER — ACETAMINOPHEN 325 MG/1
650 TABLET ORAL EVERY 4 HOURS PRN
Status: DISCONTINUED | OUTPATIENT
Start: 2025-08-01 | End: 2025-08-01

## 2025-08-01 RX ORDER — CEFAZOLIN SODIUM 1 G/3ML
INJECTION, POWDER, FOR SOLUTION INTRAMUSCULAR; INTRAVENOUS AS NEEDED
Status: DISCONTINUED | OUTPATIENT
Start: 2025-08-01 | End: 2025-08-01 | Stop reason: SURG

## 2025-08-01 RX ORDER — OXYCODONE HYDROCHLORIDE 5 MG/1
10 TABLET ORAL EVERY 4 HOURS PRN
Status: DISCONTINUED | OUTPATIENT
Start: 2025-08-01 | End: 2025-08-06 | Stop reason: HOSPADM

## 2025-08-01 RX ORDER — FENTANYL CITRATE 50 UG/ML
50 INJECTION, SOLUTION INTRAMUSCULAR; INTRAVENOUS EVERY 5 MIN PRN
Status: COMPLETED | OUTPATIENT
Start: 2025-08-01 | End: 2025-08-01

## 2025-08-01 RX ORDER — ROCURONIUM BROMIDE 50 MG/5 ML
SYRINGE (ML) INTRAVENOUS AS NEEDED
Status: DISCONTINUED | OUTPATIENT
Start: 2025-08-01 | End: 2025-08-01 | Stop reason: SURG

## 2025-08-01 RX ORDER — CELECOXIB 200 MG/1
200 CAPSULE ORAL ONCE
Status: COMPLETED | OUTPATIENT
Start: 2025-08-01 | End: 2025-08-01

## 2025-08-01 RX ORDER — GABAPENTIN 300 MG/1
600 CAPSULE ORAL ONCE
Status: COMPLETED | OUTPATIENT
Start: 2025-08-01 | End: 2025-08-01

## 2025-08-01 RX ORDER — DEXTROSE 40 %
15-30 GEL (GRAM) ORAL AS NEEDED
Status: DISCONTINUED | OUTPATIENT
Start: 2025-08-01 | End: 2025-08-01 | Stop reason: HOSPADM

## 2025-08-01 RX ORDER — DEXTROSE 50 % IN WATER (D50W) INTRAVENOUS SYRINGE
25 AS NEEDED
Status: DISCONTINUED | OUTPATIENT
Start: 2025-08-01 | End: 2025-08-01 | Stop reason: HOSPADM

## 2025-08-01 RX ORDER — SODIUM CHLORIDE 9 MG/ML
INJECTION, SOLUTION INTRAVENOUS CONTINUOUS
Status: ACTIVE | OUTPATIENT
Start: 2025-08-01 | End: 2025-08-02

## 2025-08-01 RX ORDER — DOCUSATE SODIUM 100 MG/1
100 CAPSULE, LIQUID FILLED ORAL 2 TIMES DAILY
Status: DISCONTINUED | OUTPATIENT
Start: 2025-08-01 | End: 2025-08-06 | Stop reason: HOSPADM

## 2025-08-01 RX ORDER — DEXMEDETOMIDINE HYDROCHLORIDE 4 UG/ML
INJECTION, SOLUTION INTRAVENOUS AS NEEDED
Status: DISCONTINUED | OUTPATIENT
Start: 2025-08-01 | End: 2025-08-01 | Stop reason: SURG

## 2025-08-01 RX ORDER — ROSUVASTATIN CALCIUM 10 MG/1
10 TABLET, COATED ORAL NIGHTLY
Status: DISCONTINUED | OUTPATIENT
Start: 2025-08-01 | End: 2025-08-06 | Stop reason: HOSPADM

## 2025-08-01 RX ORDER — AMOXICILLIN 250 MG
1 CAPSULE ORAL 2 TIMES DAILY
Status: DISCONTINUED | OUTPATIENT
Start: 2025-08-01 | End: 2025-08-06 | Stop reason: HOSPADM

## 2025-08-01 RX ORDER — HYDROMORPHONE HYDROCHLORIDE 1 MG/ML
INJECTION, SOLUTION INTRAMUSCULAR; INTRAVENOUS; SUBCUTANEOUS AS NEEDED
Status: DISCONTINUED | OUTPATIENT
Start: 2025-08-01 | End: 2025-08-01 | Stop reason: SURG

## 2025-08-01 RX ORDER — ACETAMINOPHEN 325 MG/1
650 TABLET ORAL
Status: DISCONTINUED | OUTPATIENT
Start: 2025-08-01 | End: 2025-08-06 | Stop reason: HOSPADM

## 2025-08-01 RX ORDER — KETOROLAC TROMETHAMINE 15 MG/ML
15 INJECTION, SOLUTION INTRAMUSCULAR; INTRAVENOUS
Status: COMPLETED | OUTPATIENT
Start: 2025-08-01 | End: 2025-08-02

## 2025-08-01 RX ORDER — POLYETHYLENE GLYCOL 3350 17 G/17G
17 POWDER, FOR SOLUTION ORAL DAILY
Status: DISCONTINUED | OUTPATIENT
Start: 2025-08-01 | End: 2025-08-06 | Stop reason: HOSPADM

## 2025-08-01 RX ORDER — HYDROMORPHONE HYDROCHLORIDE 1 MG/ML
0.5 INJECTION, SOLUTION INTRAMUSCULAR; INTRAVENOUS; SUBCUTANEOUS
Status: DISCONTINUED | OUTPATIENT
Start: 2025-08-01 | End: 2025-08-01 | Stop reason: HOSPADM

## 2025-08-01 RX ORDER — PHENYLEPHRINE HCL IN 0.9% NACL 1 MG/10 ML
SYRINGE (ML) INTRAVENOUS AS NEEDED
Status: DISCONTINUED | OUTPATIENT
Start: 2025-08-01 | End: 2025-08-01 | Stop reason: SURG

## 2025-08-01 RX ORDER — FAMOTIDINE 20 MG/1
20 TABLET, FILM COATED ORAL 2 TIMES DAILY
Status: DISCONTINUED | OUTPATIENT
Start: 2025-08-01 | End: 2025-08-06 | Stop reason: HOSPADM

## 2025-08-01 RX ORDER — ONDANSETRON HYDROCHLORIDE 2 MG/ML
4 INJECTION, SOLUTION INTRAVENOUS
Status: DISCONTINUED | OUTPATIENT
Start: 2025-08-01 | End: 2025-08-01 | Stop reason: HOSPADM

## 2025-08-01 RX ORDER — PROCHLORPERAZINE EDISYLATE 5 MG/ML
10 INJECTION INTRAMUSCULAR; INTRAVENOUS EVERY 6 HOURS PRN
Status: DISCONTINUED | OUTPATIENT
Start: 2025-08-01 | End: 2025-08-06 | Stop reason: HOSPADM

## 2025-08-01 RX ORDER — DEXTROSE 40 %
15-30 GEL (GRAM) ORAL AS NEEDED
Status: DISCONTINUED | OUTPATIENT
Start: 2025-08-01 | End: 2025-08-06 | Stop reason: HOSPADM

## 2025-08-01 RX ORDER — DIPHENHYDRAMINE HCL 25 MG
25 CAPSULE ORAL EVERY 6 HOURS PRN
Status: DISCONTINUED | OUTPATIENT
Start: 2025-08-01 | End: 2025-08-06 | Stop reason: HOSPADM

## 2025-08-01 RX ORDER — MIDAZOLAM HYDROCHLORIDE 2 MG/2ML
INJECTION, SOLUTION INTRAMUSCULAR; INTRAVENOUS AS NEEDED
Status: DISCONTINUED | OUTPATIENT
Start: 2025-08-01 | End: 2025-08-01 | Stop reason: SURG

## 2025-08-01 RX ORDER — DEXTROSE 50 % IN WATER (D50W) INTRAVENOUS SYRINGE
25 AS NEEDED
Status: DISCONTINUED | OUTPATIENT
Start: 2025-08-01 | End: 2025-08-06 | Stop reason: HOSPADM

## 2025-08-01 RX ORDER — ADHESIVE BANDAGE 7/8"
15-30 BANDAGE TOPICAL AS NEEDED
Status: DISCONTINUED | OUTPATIENT
Start: 2025-08-01 | End: 2025-08-06 | Stop reason: HOSPADM

## 2025-08-01 RX ORDER — ONDANSETRON HYDROCHLORIDE 2 MG/ML
4 INJECTION, SOLUTION INTRAVENOUS ONCE
Status: COMPLETED | OUTPATIENT
Start: 2025-08-01 | End: 2025-08-01

## 2025-08-01 RX ORDER — LEVOTHYROXINE SODIUM 125 UG/1
125 TABLET ORAL
Status: DISCONTINUED | OUTPATIENT
Start: 2025-08-01 | End: 2025-08-06 | Stop reason: HOSPADM

## 2025-08-01 RX ORDER — ADHESIVE BANDAGE 7/8"
15-30 BANDAGE TOPICAL AS NEEDED
Status: DISCONTINUED | OUTPATIENT
Start: 2025-08-01 | End: 2025-08-01 | Stop reason: HOSPADM

## 2025-08-01 RX ORDER — ALUMINUM HYDROXIDE, MAGNESIUM HYDROXIDE, AND SIMETHICONE 1200; 120; 1200 MG/30ML; MG/30ML; MG/30ML
30 SUSPENSION ORAL EVERY 4 HOURS PRN
Status: DISCONTINUED | OUTPATIENT
Start: 2025-08-01 | End: 2025-08-06 | Stop reason: HOSPADM

## 2025-08-01 RX ORDER — BUPIVACAINE HYDROCHLORIDE 5 MG/ML
INJECTION, SOLUTION PERINEURAL
Status: DISCONTINUED | OUTPATIENT
Start: 2025-08-01 | End: 2025-08-01 | Stop reason: HOSPADM

## 2025-08-01 RX ADMIN — DOCUSATE SODIUM AND SENNOSIDES 1 TABLET: 8.6; 5 TABLET, FILM COATED ORAL at 20:34

## 2025-08-01 RX ADMIN — VANCOMYCIN HYDROCHLORIDE 1000 MG: 1 INJECTION, POWDER, LYOPHILIZED, FOR SOLUTION INTRAVENOUS at 08:48

## 2025-08-01 RX ADMIN — ACETAMINOPHEN 650 MG: 325 TABLET ORAL at 18:25

## 2025-08-01 RX ADMIN — GABAPENTIN 600 MG: 300 CAPSULE ORAL at 08:49

## 2025-08-01 RX ADMIN — Medication 200 MCG: at 10:21

## 2025-08-01 RX ADMIN — DOCUSATE SODIUM 100 MG: 100 CAPSULE, LIQUID FILLED ORAL at 20:34

## 2025-08-01 RX ADMIN — MIDAZOLAM 2 MG: 1 INJECTION INTRAMUSCULAR; INTRAVENOUS at 09:38

## 2025-08-01 RX ADMIN — FENTANYL CITRATE 50 MCG: 50 INJECTION INTRAMUSCULAR; INTRAVENOUS at 09:52

## 2025-08-01 RX ADMIN — CELECOXIB 200 MG: 200 CAPSULE ORAL at 08:49

## 2025-08-01 RX ADMIN — FENTANYL CITRATE 50 MCG: 50 INJECTION INTRAMUSCULAR; INTRAVENOUS at 12:34

## 2025-08-01 RX ADMIN — SODIUM CHLORIDE: 9 INJECTION, SOLUTION INTRAVENOUS at 09:38

## 2025-08-01 RX ADMIN — HYDROMORPHONE HYDROCHLORIDE 0.5 MG: 1 INJECTION, SOLUTION INTRAMUSCULAR; INTRAVENOUS; SUBCUTANEOUS at 13:00

## 2025-08-01 RX ADMIN — DEXAMETHASONE SODIUM PHOSPHATE 4 MG: 4 INJECTION, SOLUTION INTRA-ARTICULAR; INTRALESIONAL; INTRAMUSCULAR; INTRAVENOUS; SOFT TISSUE at 10:17

## 2025-08-01 RX ADMIN — ONDANSETRON 4 MG: 2 INJECTION INTRAMUSCULAR; INTRAVENOUS at 08:54

## 2025-08-01 RX ADMIN — KETOROLAC TROMETHAMINE 15 MG: 15 INJECTION, SOLUTION INTRAMUSCULAR; INTRAVENOUS at 21:22

## 2025-08-01 RX ADMIN — FENTANYL CITRATE 50 MCG: 50 INJECTION INTRAMUSCULAR; INTRAVENOUS at 09:42

## 2025-08-01 RX ADMIN — Medication 50 MG: at 10:23

## 2025-08-01 RX ADMIN — VALACYCLOVIR HYDROCHLORIDE 500 MG: 1 TABLET, FILM COATED ORAL at 20:34

## 2025-08-01 RX ADMIN — FENTANYL CITRATE 50 MCG: 50 INJECTION INTRAMUSCULAR; INTRAVENOUS at 12:10

## 2025-08-01 RX ADMIN — Medication 100 MCG: at 10:09

## 2025-08-01 RX ADMIN — Medication 50 MG: at 09:52

## 2025-08-01 RX ADMIN — ROSUVASTATIN 10 MG: 10 TABLET, FILM COATED ORAL at 21:22

## 2025-08-01 RX ADMIN — PROPOFOL 25 MCG/KG/MIN: 10 INJECTION, EMULSION INTRAVENOUS at 10:28

## 2025-08-01 RX ADMIN — SUGAMMADEX 200 MG: 100 INJECTION, SOLUTION INTRAVENOUS at 11:42

## 2025-08-01 RX ADMIN — CEFAZOLIN 2 G: 2 INJECTION, POWDER, FOR SOLUTION INTRAMUSCULAR; INTRAVENOUS at 18:24

## 2025-08-01 RX ADMIN — HYDROMORPHONE HYDROCHLORIDE 1 MG: 1 INJECTION, SOLUTION INTRAMUSCULAR; INTRAVENOUS; SUBCUTANEOUS at 10:32

## 2025-08-01 RX ADMIN — SODIUM CHLORIDE: 9 INJECTION, SOLUTION INTRAVENOUS at 16:01

## 2025-08-01 RX ADMIN — CEFAZOLIN 2 G: 2 INJECTION, POWDER, FOR SOLUTION INTRAMUSCULAR; INTRAVENOUS at 09:52

## 2025-08-01 RX ADMIN — KETOROLAC TROMETHAMINE 15 MG: 15 INJECTION, SOLUTION INTRAMUSCULAR; INTRAVENOUS at 16:09

## 2025-08-01 RX ADMIN — SUGAMMADEX 200 MG: 100 INJECTION, SOLUTION INTRAVENOUS at 11:37

## 2025-08-01 RX ADMIN — PROPOFOL 200 MG: 10 INJECTION, EMULSION INTRAVENOUS at 09:52

## 2025-08-01 RX ADMIN — HYDROMORPHONE HYDROCHLORIDE 0.5 MG: 1 INJECTION, SOLUTION INTRAMUSCULAR; INTRAVENOUS; SUBCUTANEOUS at 13:12

## 2025-08-01 RX ADMIN — FAMOTIDINE 20 MG: 20 TABLET, FILM COATED ORAL at 20:34

## 2025-08-01 RX ADMIN — DEXMEDETOMIDINE HYDROCHLORIDE IN 0.9% SODIUM CHLORIDE 4 MCG: 4 INJECTION INTRAVENOUS at 10:42

## 2025-08-01 RX ADMIN — FENTANYL CITRATE 50 MCG: 50 INJECTION INTRAMUSCULAR; INTRAVENOUS at 10:26

## 2025-08-01 RX ADMIN — PROCHLORPERAZINE EDISYLATE 10 MG: 5 INJECTION INTRAMUSCULAR; INTRAVENOUS at 16:00

## 2025-08-01 RX ADMIN — ASPIRIN 81 MG: 81 TABLET, COATED ORAL at 20:38

## 2025-08-01 ASSESSMENT — COGNITIVE AND FUNCTIONAL STATUS - GENERAL
MOVING TO AND FROM BED TO CHAIR: 3 - A LITTLE
CLIMB 3 TO 5 STEPS WITH RAILING: 1 - TOTAL
STANDING UP FROM CHAIR USING ARMS: 3 - A LITTLE
STANDING UP FROM CHAIR USING ARMS: 2 - A LOT
WALKING IN HOSPITAL ROOM: 3 - A LITTLE
CLIMB 3 TO 5 STEPS WITH RAILING: 2 - A LOT
WALKING IN HOSPITAL ROOM: 2 - A LOT
WALKING IN HOSPITAL ROOM: 2 - A LOT
CLIMB 3 TO 5 STEPS WITH RAILING: 1 - TOTAL
MOVING TO AND FROM BED TO CHAIR: 2 - A LOT
MOVING TO AND FROM BED TO CHAIR: 3 - A LITTLE
AFFECT: FLAT/BLUNTED AFFECT;LOW AROUSAL/LETHARGIC
STANDING UP FROM CHAIR USING ARMS: 2 - A LOT

## 2025-08-01 NOTE — ANESTHESIA PREPROCEDURE EVALUATION
Relevant Problems   No relevant active problems       Anesthesia ROS/MED HX    Anesthesia History - neg  Pulmonary - neg  Neuro/Psych - neg  Cardiovascular- neg  GI/Hepatic- neg       Past Surgical History   Procedure Laterality Date    Appendectomy      Tonsillectomy      Tubal ligation         Physical Exam    Airway   Mallampati: II   TM distance: >3 FB   Neck ROM: full  Cardiovascular - normal   Rhythm: regular   Rate: normalPulmonary - normal   clear to auscultation  Dental - normal        Anesthesia Plan    Plan: spinal    Technique: spinal     Lines and Monitors: PIV     Airway: natural airway / supplemental oxygen    3 ASA  Anesthetic plan and risks discussed with: patient  Postop Plan:   Patient Disposition: inpatient floor planned admission   Pain Management: IV analgesics

## 2025-08-01 NOTE — OP NOTE
Brief operative note     Preoperative diagnosis: Severe left knee DJD    Postoperative diagnosis: The same    Surgeon: Marco Manzano    Assistant: Saundra Gregg    Anesthesia: General    EBL: Minimal    Tourniquet time: 46 minutes    Specimens: None    Complications: None    Call patient to PACU in stable condition    Operative note    Patient is a 75-year-old female severe progressive degenerative disease left knee and significant pain dysfunction underwent extensive conservative measures with limited relief was consented for surgical intervention.  All respects complications alternatives were discussed with the patient is agreeable scores consent was signed SEMO in the preoperative holding area the proper upper EXTR identifies the left knee which per the patient and nursing staff she was then brought to operative suite placed in supine position operative table underwent general endotracheal ovation of difficulty left lower extremity a tourniquet placed in the proximal portion of thigh was prepped and draped in sterile fashion.    Esmarch exsanguination was utilized tourniquet was insufflated to 300 mmHg longitudinal incision was made the prepatellar region left knee sharp dissection was carried out through skin subcutaneous tissue full-thickness flaps formed medial lateral direction medial parapatellar arthrotomy was performed followed by translation of patella in a lateral direction the knee was flexed remaining portion the cruciate ligaments and meniscal fragments were excised under direct visualization the femur was cannulated distal femoral cut at 9 mm was made followed by proximal tibial cut of 6 mm extension block of 10 mm noted to fit appropriately the knee was flexed sizing of the femur showed a #9 femur which was placed in 3 degrees of external rotation anterior posterior chamfer cuts were made followed by preparation of the tibia with an F tibial tray and a 10 mm polyethylene trial with the prosthesis  showed excellent range of motion with no instability the patella was repaired in standard fashion with a 38 mm patellar button Ultralente instrumentation removed pulsatile irrigation of the deep layer was performed final prosthesis components were gently malleted into place and maintained in extension tourniquet was decreased at 46 minutes two-stage irrigation was used in the deep layer hemostasis was achieved electrocautery followed by capsular closure with a #1 Vicryl 2-0 Vicryl the subcuticular and staples were placed in the skin patient was placed in sterile dressings extubated transferred PACU in stable condition    Electronic signature:Marco Manzano D.O.

## 2025-08-01 NOTE — PLAN OF CARE
Problem: Knee Arthroplasty  Goal: Optimal Functional Ability  Outcome: Progressing     Problem: Adult Inpatient Plan of Care  Goal: Plan of Care Review  Outcome: Progressing  Flowsheets (Taken 8/1/2025 0469)  Progress: improving  Outcome Evaluation: PT eval complete  Plan of Care Reviewed With: patient     Problem: Mobility Impairment  Goal: Optimal Mobility  Outcome: Progressing

## 2025-08-01 NOTE — HOSPITAL COURSE
Dorothy is a 75 y.o. female admitted on 8/1/2025 with Osteoarthritis of left knee, unspecified osteoarthritis type [M17.12]  Localized osteoarthritis of left knee [M17.12]. Principal problem is No Principal Problem: There is no principal problem currently on the Problem List. Please update the Problem List and refresh..    Past Medical History  Dorothy has a past medical history of AML (acute myeloblastic leukemia) (CMS/HCC), Hypothyroidism, MVP (mitral valve prolapse), and Seasonal allergies.    History of Present Illness   Patient is now s/p L TKA with Dr. Manzano on 8/1/25

## 2025-08-01 NOTE — PROGRESS NOTES
[facility] referral received. Patient resides outside of [facility] service area. Referral declined, notified Ria NATARAJAN

## 2025-08-01 NOTE — PROGRESS NOTES
Orthopedic Progress Note    Subjective     Pt seen and examined at bedside, resting comfortably. S/p L TKA with Dr. Manzano today, tolerated procedure well. General anesthesia was used after spinal did not take effect      Objective       Vitals:    08/01/25 0835   BP: (!) 158/66   Pulse: 65   Resp: 18   Temp: 36.4 °C (97.6 °F)   SpO2: 98%       General: VSS, NAD    Extremities:  LLE:  Incision C/D/I  Compartments soft and compressible  Motor intact FHL/EHL/TA/GSC/Q/HS  SILT DPN/SPN/T/S/S  2+ DP pulses  Foot WWP  BCR      Assessment     75 y.o. female s/p L TKA with Dr. Manzano    Plan     -Ancef x24h  -DVT ppx: ASA BID  -WBAT BLE  -PT/OT   -Pain control  -Dispo: pending PT/OT/pain control    Saundra Gregg, DO  Orthopedic Surgery, PGY-5  Pager: [phone]

## 2025-08-01 NOTE — PLAN OF CARE
Care Coordination Discharge Plan Summary    Admission Assessment Summary    General Information  Readmission Within the last 30 days:  no  Does patient have a :    Patient-Specific Goals (include timeframe): pain control and safe dc after hospital admission  Return home  Living Arrangements  Arrived From:  home/pacu  Current Living Arrangements:  2nd floor apt  People in Home:    Home Accessibility:  stairs and elevator  Living Arrangement Comments:      Social Drivers of Health - Screenings  Housing Stability     Utility Access     Transportation Needs   no    Functional Status Prior to Admission  Assistive Device/Animal Currently Used at Home:  none  Functional Status Comments:  ind  IADL Comments:  ind    Discharge Needs Assessment    Concerns to be Addressed:  none  Current Discharge Risk:    Anticipated Changes Related to Illness:      Discharge Plan Summary    Patient Choice          Concerns / Comments:  Confirmed pharmacy/pcp. Pt lives alone in a 2nd story apt with stairs and elevator access. Ind with ADLs, does not use any DME. No history of SNF or HC. Pt agreeable to Home pt.  Referral sent to [facility]. If they do not go to pts area, pt would be ok with [facility].Her son will pick her up at [place].    [address] [facility] does not service pts area, referral sent to [facility].      Discharge Plan:  Disposition/Destination:  [facility] /  Home care       Connection to Community     Community Resources:      Discharge Transportation:  Is Out of Hospital DNR needed at Discharge:    Does patient need discharge transport?  no

## 2025-08-01 NOTE — PROGRESS NOTES
Orthopedic Progress Note    Subjective     Patient seen and examined at bedside.  Rapid response called overnight for a near syncopal event while she was moving to the bedside commode. Medical workup was unremarkable. This morning she is resting comfortably and in no apparent distress. She denies any chest pain. Required straight cath overnight. Will work with PT today.        Objective       Vitals:    08/01/25 1615   BP:    Pulse:    Resp:    Temp:    SpO2: 100%       General: VSS, NAD    Extremities:  LLE:  Incision C/D/I  Compartments soft and compressible  Motor intact FHL/EHL/TA/GSC/Q/HS  SILT DPN/SPN/T/S/S  2+ DP pulses  Foot WWP  BCR      Assessment     75 y.o. female s/p L TKA with Dr. Manzano    Plan     -Ancef x24h  -DVT ppx: ASA BID  -WBAT BLE  -PT/OT   -Pain control  -Follow-up   -Dispo: pending PT/OT/pain control      Jonn Hernandez DO  Orthopedic Surgery, PGY-4  Pager: [phone]

## 2025-08-01 NOTE — ANESTHESIOLOGIST PRE-PROCEDURE ATTESTATION
Pre-Procedure Patient Identification:  I am the Primary Anesthesiologist and have identified the patient on 08/01/25 at 8:51 AM.   I have confirmed the procedure(s) will be performed by the following surgeon/proceduralist Marco Manzano DO.

## 2025-08-01 NOTE — ANESTHESIOLOGIST PRE-PROCEDURE ATTESTATION
Pre-Procedure Patient Identification:  I am the Primary Anesthesiologist and have identified the patient on 08/01/25 at 8:55 AM.   I have confirmed the procedure(s) will be performed by the following surgeon/proceduralist Marco Manzano DO.

## 2025-08-01 NOTE — PROGRESS NOTES
Physical Therapy -  Initial Evaluation     Patient: Dorothy Hernandez  Location: [facility] 4B 2175  MRN: [id number]  Today's date: 8/1/2025    HISTORY OF PRESENT ILLNESS     Dorothy is a 75 y.o. female admitted on 8/1/2025 with Osteoarthritis of left knee, unspecified osteoarthritis type [M17.12]  Localized osteoarthritis of left knee [M17.12]. Principal problem is No Principal Problem: There is no principal problem currently on the Problem List. Please update the Problem List and refresh..    Past Medical History  Dorothy has a past medical history of AML (acute myeloblastic leukemia) (CMS/HCC), Hypothyroidism, MVP (mitral valve prolapse), and Seasonal allergies.    History of Present Illness   Patient is now s/p L TKA with Dr. Manzano on 8/1/25    PRIOR LEVEL OF FUNCTION AND LIVING ENVIRONMENT     Prior Level of Function      Flowsheet Row Most Recent Value   Dominant Hand right   Ambulation assistive equipment   Transferring assistive equipment   Toileting independent   Bathing independent   Dressing independent   Eating independent   IADLs independent   Driving/Transportation    Communication understands/communicates without difficulty   Prior Level of Function Comment Reports being IND with all mobility and ADLs however recently with knee pain has been using RW   Assistive Device Currently Used at Home walker, front-wheeled   History of Falls: No falls in the past year    Prior Living Environment      Flowsheet Row Most Recent Value   People in Home alone   Current Living Arrangements apartment   Living Environment Comment Alone in apt with 0STE. Tub shower with SC and GBs. Son is able to help a few hours per day       VITALS AND PAIN     PT Vitals      Date/Time Pulse HR Source SpO2 Pt Activity O2 Therapy O2 Del Method O2 Flow Rate BP BP Location BP Method Pt Position [facility]   08/01/25 1815 82 Monitor 94 % At rest Supplemental oxygen Nasal cannula 2 L/min 113/55 Right upper arm Automatic Lying TCF         Initial set taken during session however documented by PCT under 1730 (see flowsheet).       Objective     OBJECTIVE     Start time:  1746  End time:  1820  Session Length: 34 min       General Observations  Patient received reclined, in bed. She was no issues or concerns identified by nurse prior to session, agreeable to therapy. L knee in postop bandaging    Precautions: fall, weight bearing  Extremity Weight-Bearing Status: left lower extremity  Left LE Weight-Bearing Status: weight-bearing as tolerated (WBAT)           PT Eval and Treat - 08/01/25 1746          Cognition    Orientation Status oriented x 3     Affect/Mental Status flat/blunted affect;low arousal/lethargic     Follows Commands follows one-step commands;75-90% accuracy;verbal cues/prompting required;repetition of directions required     Cognitive Function attention deficit;safety deficit     Attention Deficit moderate deficit;focused/sustained attention;arousal/alertness     Safety Deficit insight into deficits/self-awareness;minimal deficit     Comment, Cognition Pleasant and cooperative however decreased arousal likely secondary to anesthesia. Needs repeated cueing to keep attention. Difficulty keeping eyes open at times        Vision Assessment/Intervention    Vision Assessment WFL;corrective lenses for reading        Hearing Assessment    Hearing Status WFL        Sensory Assessment    Sensory Assessment sensation intact, lower extremities        Lower Extremity Assessment    LE Assessment ROM and Strength WFL except     General Observations LLE in post-op bandaging        Lower Extremity Range of Motion    Knee, Left (ROM) Ext: -15 Flx: approx 70        Lower Extremity Strength    Hip, Left (Strength) 3-/5     Knee, Left (Strength) 3-/5 limited ROM     Ankle, Left (Strength) 4-/5     Hip, Right (Strength) 4+/5     Knee, Right (Strength) 4+/5     Ankle, Right (Strength) 5/5        Bed Mobility    Bed Mobility Activities left;supine to  sit     [place] minimum assist (75% or more patient effort);1 person assist     Safety/Cues increased time to complete;sequencing;technique     Assistive Device bed rails;head of bed elevated     Comment Albertina to offload weight to LLE        Mobility Belt    Mobility Belt Used During Session yes        Sit/Stand Transfer    Surface edge of bed;chair with arm rests     [place] minimum assist (75% or more patient effort);1 person assist     Safety/Cues verbal cues;sequencing;technique     Assistive Device walker, front-wheeled     Transfer Comments Performs several times during each needing Albertina for lift off. Needs repeated cueing for hand placement with poor carryover        Surface-to-Surface Transfers    Transfer Location bed to chair;commode     Transfer Technique stand step     [place] minimum assist (75% or more patient effort);1 person assist     Safety/Cues verbal cues;technique;sequencing     Assistive Device walker, front-wheeled     Transfer Comments Performs transfer to chair > commode > chair during session each needing Albertina for steadying along with step by step cueing for sequencing of LE and RW. Reports increased pain with WB        Gait Training    [place], Gait minimum assist (75% or more patient effort);1 person assist     Safety/Cues verbal cues;gait deviations;sequencing;technique;preparatory posture     Assistive Device walker, front-wheeled     Distance in Feet 4 feet     Pattern step-to     Deviations/Abnormal Patterns weight shifting decreased;left sided deviations;step length decreased     Left Sided Gait Deviations heel strike decreased     Comment Steps with transfers during session. Unable obtain full L knee extension. Noting decreased L heel strike and stance time due to pain. Needs repeated cueing for sequencing. Unable to progress for safety concern        Balance    Static Sitting Balance WFL;sitting, edge of bed     Dynamic Sitting Balance mild impairment;sitting,  edge of bed     Sit to Stand Dynamic Balance mild impairment;supported     Static Standing Balance mild impairment;supported     Dynamic Standing Balance mild impairment;supported     Comment, Balance Albertina for all mobility        Impairments/Safety Issues    Impairments Affecting Function balance;range of motion (ROM);strength;pain;endurance/activity tolerance     Functional Endurance Fair (-), limited by pain and arousal     Safety Issues Affecting Function insight into deficits/self-awareness;safety precaution awareness;awareness of need for assistance                                              Education Documentation  Joint Mobility/Strength, taught by Alan Estrella PT at 8/1/2025  6:54 PM.  Learner: Patient  Readiness: Acceptance  Method: Explanation  Response: Verbalizes Understanding  Comment: PT POC, role of PT, and progression towards goals. Educated on WB status and importance of mobility    Home Safety, taught by Alan Estrella PT at 8/1/2025  6:54 PM.  Learner: Patient  Readiness: Acceptance  Method: Explanation  Response: Verbalizes Understanding  Comment: PT POC, role of PT, and progression towards goals. Educated on WB status and importance of mobility    Activity, taught by Alan Estrella PT at 8/1/2025  6:54 PM.  Learner: Patient  Readiness: Acceptance  Method: Explanation  Response: Verbalizes Understanding  Comment: PT POC, role of PT, and progression towards goals. Educated on WB status and importance of mobility        Session Outcome  Patient reclined, in chair at end of session, chair alarm on, all needs met, call light in reach, personal items in reach. Nursing notified about patient's position, patient's response to therapy/activity, and patient's performance.    AM-PAC™ - Mobility (Current Function)     Turning form your back to your side while in flat bed without using bedrails 3 - A Little   Moving from lying on your back to sitting on the side of a  flat bed without using bedrails 3 - A Little   Moving to and from a bed to a chair 3 - A Little   Standing up from a chair using your arms 3 - A Little   To walk in a hospital room 3 - A Little   Climbing 3-5 steps with a railing 1 - Total   AM-PAC™ Mobility Score 16          ASSESSMENT AND PLAN     Progress Summary  Patient seen for PT evaluation this PM. Completes supine to sit with Albertina for LLE management to offload weight. Performs several STS transfers this session needing Albertina for lift off along with repeated cueing for hand placement however patient with poor carryover. Taking steps with several transfers done this session. Noting decreased L stance time and heel strike along with inability to obtain full L knee extension. Needs cueing for proper step to pattern. Patient with difficulty maintaining attention during session which limited overall mobility along with deficits in strength, ROM, endurance, and balance. Continue skilled PT to maximize IND. Anticipate d/c to home with assist and HH. [facility] 16    Patient/Family Therapy Goals Statement: To go home    PT Plan      Flowsheet Row Most Recent Value   Rehab Potential good, to achieve stated therapy goals at 08/01/2025 1746   Therapy Frequency 5 times/wk at 08/01/2025 1746   Planned Therapy Interventions balance training, bed mobility training, gait training, home exercise program, patient/family education, strengthening, ROM (range of motion), transfer training at 08/01/2025 1746       PT Discharge Recommendations      Flowsheet Row Most Recent Value   PT Recommended Discharge Disposition home with assistance, home with home health at 08/01/2025 1746   Anticipated Equipment Needs if Discharged Home (PT) none at 08/01/2025 1746            PT Goals      Flowsheet Row Most Recent Value   Bed Mobility Goal 1    Activity/Assistive Device bed mobility activities, all at 08/01/2025 1746   [place] modified independence at 08/01/2025 1746   Time Frame by  discharge at 08/01/2025 1746   Progress/Outcome goal ongoing at 08/01/2025 1746   Transfer Goal 1    Activity/Assistive Device all transfers, walker, front-wheeled at 08/01/2025 1746   [place] modified independence at 08/01/2025 1746   Time Frame by discharge at 08/01/2025 1746   Progress/Outcome goal ongoing at 08/01/2025 1746   Gait Training Goal 1    Activity/Assistive Device gait (walking locomotion), walker, front-wheeled at 08/01/2025 1746   [place] modified independence at 08/01/2025 1746   Distance 75 at 08/01/2025 1746   Time Frame by discharge at 08/01/2025 1746   Progress/Outcome goal ongoing at 08/01/2025 1746

## 2025-08-01 NOTE — PROGRESS NOTES
Hospital Medicine Service  Daily Progress Note       SUBJECTIVE     Patient seen earlier today.  Sleeping, easily arousable.  Reports a lot of pain in the left knee.  Some nausea earlier but better now     OBJECTIVE        Vital Signs  Temp:  [36.2 °C (97.1 °F)-36.4 °C (97.6 °F)] 36.2 °C (97.2 °F)  Heart Rate:  [64-78] 74  Resp:  [12-20] 16  BP: (122-158)/(55-71) 132/61     SpO2 Readings from Last 3 Encounters:   08/01/25 100%   07/29/25 97%       No intake/output data recorded.    PHYSICAL EXAMINATION        GEN:; not in acute distress  HEENT: normocephalic; atraumatic    CARDIO: regular rate and rhythm;   RESP: decreased at bases  ABD: soft,  non-tender, normal bowel sounds  EXT: Left knee dressing present  NEURO: Sleeping, easily arousable, nonfocal       LABS / IMAGING / TELE        Labs  Lab Results   Component Value Date    WBC 6.24 07/29/2025    HGB 13.5 07/29/2025    HCT 42.3 07/29/2025    MCV 97.9 07/29/2025     07/29/2025     Lab Results   Component Value Date    GLUCOSE 152 (H) 07/29/2025    CALCIUM 9.6 07/29/2025     07/29/2025    K 4.6 07/29/2025    CO2 29 07/29/2025     07/29/2025    BUN 13 07/29/2025    CREATININE 1.0 07/29/2025     Lab Results   Component Value Date    INR 1.1 07/29/2025       Imaging  X-RAY KNEE LEFT 1 OR 2 VIEWS  Result Date: 8/1/2025  IMPRESSION: Status post total knee arthroplasty without immediate hardware complication.         ASSESSMENT AND PLAN      # s/p  left knee arthroplasty  placement as per orthopedics.  Tylenol around-the-clock, oxycodone and Dilaudid as needed     # Chronic kidney disease stage 3a  Creatinine stable     #  history of AML  Diagnosed in 2021, she has allogenic stem cell transplant, now in remission  She is on Ivosidenib (TIBSOVO 250 mg a day) daily (nonformulary)  She will bring the medication from home, so resume on the day post surgically  Monitor cbc look for wbc count, rbc transfuse to keep>7 and platelets  Her oncologist at  [facility] okay with aspirin or DVT prophylaxis if needed   Follow up with oncology 3 months     #  h/o vasovagal syncope  Patient has multiple episodes of vasovagal syncope in the past  Avoid pain, stress as much as possible  Fall precautions  Ambulate to bathroom and monitor closely, do not leave her alone while using rest room     #  hypothyroidism   - continue home: levothyroxine tablet po daily     #  GERD  - continue home: famotidine 20 mg po BID     #  hyperlipidemia  continue Crestor     VTE Assessment: Aspirin twice daily  Code Status: Full Code  Estimated discharge date: 8/2/2025     Rey Almonte MD  8/1/2025  4:21 PM

## 2025-08-01 NOTE — PLAN OF CARE
Plan of Care Review  Plan of Care Reviewed With: patient  Progress: improving  Outcome Evaluation: AAOX4. Very lethargic but arouses to voice. VSS. 2L nc. OOB AX2 with therapy.Pt gait unsteady. Incontinent of bladder when ambulated to chair. Tylenol and toradol given for pain. Ice appied to LLE. Chair alarm set with call bell in reach

## 2025-08-01 NOTE — DISCHARGE INSTRUCTIONS
TOTAL KNEE      MEDICATION:    If you are taking Aspirin:  Enteric coated aspirin 2 times a day for blood clot prevention and take for 6 weeks.  May use over-the-counter Pepcid or Zantac if stomach upset occurs.    PAIN CONTROL:    You will be given a prescription for pain medication. Take your pain medicine as prescribed with food if possible. You can expect to have pain during the healing process from the incision and it will improve.     Use anti-inflammatories everyday (if prescribed) until you see your doctor. Opioid pain reliever is to be taken only as needed for pain.    DO NOT DRIVE WHILE TAKING PAIN MEDICATION.    Some patients find that they have some difficulty with constipation after surgery. This is due to a combination of things. Most of this is due to the pain medication you are taking. The pain medications, along with a decrease in activity, can sometimes lead to discomfort from constipation. You should eat plenty of fresh fruits, vegetables, drink fruit juices and drink plenty of water.    INCISION CARE:  Look at incision every day. Keep incision clean and dry.  Maintain Mepilex dressing for 5 days total.  Maintain CHAYA stockings. May remove for showering and at night while sleeping.  If you have steri-strips, leave them on until they fall off.  Your staples or stitches will be removed about 18 to 24 days after surgery. Your incision will heal and the swelling and bruising will get better over the next 3 weeks.  If you have glue closing your incision, do NOT pull or pick off.  It will dissolve naturally.  You may shower but no tub baths, swimming, jacuzzi tubs, or any other activity that would require submersion of your incision in water.      Call your doctor if you notice any of the following:  Fever over 101.5 degrees  Drainage from incision  Increased swelling around incision  Increased redness around incision line  Thigh/calf pain or swelling in legs  Chest pain  Chest congestion  Problems with  breathing    ACTIVITY:  You may progress to a cane when ready.  You may put as much weight as you can tolerate on your operative leg (unless instructed otherwise).  Balance rest and activity.    DO NOT SMOKE! Smoking is not healthy for your healing process.  No driving for at least 3 weeks. You must also be off of prescription opioids.    KNEE PRECAUTIONS:  No pillow under the knee.  No twisting or kneeling.    FOLLOW Up:  Call now for an appointment in 2 weeks from your date of surgery with Dr. Manzano. [phone]        *FOR ANY ORTHOPEDIC ISSUES OR CONCERNS THAT NEED TO BE ADDRESSED IN PERSON, YOU MAY REPORT TO THE URGENT CARE LOCATED AT THE [facility] WHICH HAS EVENING AND WEEKEND HOURS, INSTEAD OF REPORTING TO THE EMERGENCY ROOM    For your and/or your 's information, important details about activity and expectations that were reviewed during your hospital stay can be at found in our Discharge Video overview at [email or web address]

## 2025-08-01 NOTE — OR SURGEON
Pre-Procedure patient identification:  I am the primary operating surgeon/proceduralist and I have reviewed the applicable pathology reports and radiology studies for this procedure. I have identified the patient and confirmed laterality is left on 08/01/25 at 8:49 AM Marco Manzano DO  Phone Number: [phone]

## 2025-08-01 NOTE — ANESTHESIA PROCEDURE NOTES
Airway  Reason: elective    Start Time: 8/1/2025 9:56 AM  Airway not difficult    General Information and Staff   Patient location during procedure: OR  Anesthesiologist: Charly Guerrero MD  Resident/CRNA: Nahomi Lopez CRNA  Performed: resident/CRNA   Performed by: Nahomi Lopez CRNA  Authorized by: Charly Guerrero MD        Patient Condition  Indications for airway management: anesthesia  Patient position: sniffing  Sedation level: deep     Final Airway Details   Preoxygenated: yes  Final airway type: endotracheal airway  Successful airway: ETT  Cuffed: yes   Successful intubation technique: video laryngoscopy  Adjuncts used in placement: intubating stylet  Endotracheal tube insertion site: oral  Blade: Cristal  Blade size: #3  ETT size (mm): 7.5  Cormack-Lehane Classification: grade I - full view of glottis  Placement verified by: chest auscultation and capnometry   Cuff volume (mL): 6  Measured from: lips  ETT to lips (cm): 22  Number of attempts at approach: 2  Number of other approaches attempted: 0  Atraumatic airway insertion      Additional Comments  Grade 3 view with DL, Grade 1 view with glide.

## 2025-08-02 PROBLEM — Z96.652 S/P TKR (TOTAL KNEE REPLACEMENT), LEFT: Status: ACTIVE | Noted: 2025-08-02

## 2025-08-02 PROBLEM — R55 NEAR SYNCOPE: Status: ACTIVE | Noted: 2025-08-02

## 2025-08-02 PROBLEM — R33.9 URINARY RETENTION: Status: ACTIVE | Noted: 2025-08-02

## 2025-08-02 PROBLEM — C92.00 ACUTE MYELOID LEUKEMIA NOT HAVING ACHIEVED REMISSION (CMS/HCC): Status: ACTIVE | Noted: 2025-07-29

## 2025-08-02 LAB
ANION GAP SERPL CALC-SCNC: 7 MEQ/L (ref 3–15)
ATRIAL RATE: 77
BUN SERPL-MCNC: 16 MG/DL (ref 7–25)
CALCIUM SERPL-MCNC: 8.2 MG/DL (ref 8.6–10.3)
CHLORIDE SERPL-SCNC: 106 MEQ/L (ref 98–107)
CO2 SERPL-SCNC: 24 MEQ/L (ref 21–31)
CREAT SERPL-MCNC: 1 MG/DL (ref 0.6–1.2)
EGFRCR SERPLBLD CKD-EPI 2021: 58.9 ML/MIN/1.73M*2
ERYTHROCYTE [DISTWIDTH] IN BLOOD BY AUTOMATED COUNT: 14.3 % (ref 11.7–14.4)
GLUCOSE SERPL-MCNC: 196 MG/DL (ref 70–99)
HCT VFR BLD AUTO: 32.9 % (ref 35–45)
HGB BLD-MCNC: 10.6 G/DL (ref 11.8–15.7)
MCH RBC QN AUTO: 31.3 PG (ref 28–33.2)
MCHC RBC AUTO-ENTMCNC: 32.2 G/DL (ref 32.2–35.5)
MCV RBC AUTO: 97.1 FL (ref 83–98)
P AXIS: 65
PLATELET # BLD AUTO: 175 K/UL (ref 150–369)
PMV BLD AUTO: 8.9 FL (ref 9.4–12.3)
POTASSIUM SERPL-SCNC: 4.1 MEQ/L (ref 3.5–5.1)
PR INTERVAL: 162
QRS DURATION: 104
QT INTERVAL: 432
QTC CALCULATION(BAZETT): 488
R AXIS: 5
RBC # BLD AUTO: 3.39 M/UL (ref 3.93–5.22)
SODIUM SERPL-SCNC: 137 MEQ/L (ref 136–145)
T WAVE AXIS: 67
VENTRICULAR RATE: 77
WBC # BLD AUTO: 8.95 K/UL (ref 3.8–10.5)

## 2025-08-02 PROCEDURE — 97530 THERAPEUTIC ACTIVITIES: CPT | Mod: GP,U8

## 2025-08-02 PROCEDURE — 25800000 HC PHARMACY IV SOLUTIONS: Performed by: ORTHOPAEDIC SURGERY

## 2025-08-02 PROCEDURE — 99232 SBSQ HOSP IP/OBS MODERATE 35: CPT | Performed by: HOSPITALIST

## 2025-08-02 PROCEDURE — 63600000 HC DRUGS/DETAIL CODE: Mod: JZ | Performed by: NURSE PRACTITIONER

## 2025-08-02 PROCEDURE — 97166 OT EVAL MOD COMPLEX 45 MIN: CPT | Mod: GO,U8

## 2025-08-02 PROCEDURE — 20600000 HC ROOM AND CARE INTERMEDIATE/TELEMETRY

## 2025-08-02 PROCEDURE — 97116 GAIT TRAINING THERAPY: CPT | Mod: GP,U8

## 2025-08-02 PROCEDURE — 63700000 HC SELF-ADMINISTRABLE DRUG: Performed by: NURSE PRACTITIONER

## 2025-08-02 PROCEDURE — 63600000 HC DRUGS/DETAIL CODE: Mod: JZ | Performed by: ORTHOPAEDIC SURGERY

## 2025-08-02 PROCEDURE — 93010 ELECTROCARDIOGRAM REPORT: CPT | Performed by: STUDENT IN AN ORGANIZED HEALTH CARE EDUCATION/TRAINING PROGRAM

## 2025-08-02 PROCEDURE — 63700000 HC SELF-ADMINISTRABLE DRUG: Performed by: ORTHOPAEDIC SURGERY

## 2025-08-02 PROCEDURE — 97530 THERAPEUTIC ACTIVITIES: CPT | Mod: GO,U8

## 2025-08-02 RX ADMIN — LEVOTHYROXINE SODIUM 125 MCG: 0.12 TABLET ORAL at 05:50

## 2025-08-02 RX ADMIN — DOCUSATE SODIUM 100 MG: 100 CAPSULE, LIQUID FILLED ORAL at 20:26

## 2025-08-02 RX ADMIN — ACETAMINOPHEN 650 MG: 325 TABLET ORAL at 00:22

## 2025-08-02 RX ADMIN — ROSUVASTATIN 10 MG: 10 TABLET, FILM COATED ORAL at 20:26

## 2025-08-02 RX ADMIN — VALACYCLOVIR HYDROCHLORIDE 500 MG: 1 TABLET, FILM COATED ORAL at 20:26

## 2025-08-02 RX ADMIN — ASPIRIN 81 MG: 81 TABLET, COATED ORAL at 20:26

## 2025-08-02 RX ADMIN — OXYCODONE HYDROCHLORIDE 10 MG: 5 TABLET ORAL at 13:35

## 2025-08-02 RX ADMIN — POLYETHYLENE GLYCOL 3350 17 G: 17 POWDER, FOR SOLUTION ORAL at 08:55

## 2025-08-02 RX ADMIN — ACETAMINOPHEN 650 MG: 325 TABLET ORAL at 05:50

## 2025-08-02 RX ADMIN — OXYCODONE HYDROCHLORIDE 10 MG: 5 TABLET ORAL at 20:25

## 2025-08-02 RX ADMIN — ACETAMINOPHEN 650 MG: 325 TABLET ORAL at 12:35

## 2025-08-02 RX ADMIN — CEFAZOLIN 2 G: 2 INJECTION, POWDER, FOR SOLUTION INTRAMUSCULAR; INTRAVENOUS at 01:27

## 2025-08-02 RX ADMIN — DOCUSATE SODIUM AND SENNOSIDES 1 TABLET: 8.6; 5 TABLET, FILM COATED ORAL at 08:55

## 2025-08-02 RX ADMIN — VALACYCLOVIR HYDROCHLORIDE 500 MG: 1 TABLET, FILM COATED ORAL at 08:55

## 2025-08-02 RX ADMIN — FAMOTIDINE 20 MG: 20 TABLET, FILM COATED ORAL at 08:55

## 2025-08-02 RX ADMIN — DOCUSATE SODIUM 100 MG: 100 CAPSULE, LIQUID FILLED ORAL at 08:55

## 2025-08-02 RX ADMIN — ACETAMINOPHEN 650 MG: 325 TABLET ORAL at 18:30

## 2025-08-02 RX ADMIN — KETOROLAC TROMETHAMINE 15 MG: 15 INJECTION, SOLUTION INTRAMUSCULAR; INTRAVENOUS at 03:05

## 2025-08-02 RX ADMIN — OXYCODONE HYDROCHLORIDE 10 MG: 5 TABLET ORAL at 09:00

## 2025-08-02 RX ADMIN — ASPIRIN 81 MG: 81 TABLET, COATED ORAL at 08:55

## 2025-08-02 RX ADMIN — FAMOTIDINE 20 MG: 20 TABLET, FILM COATED ORAL at 20:26

## 2025-08-02 RX ADMIN — DOCUSATE SODIUM AND SENNOSIDES 1 TABLET: 8.6; 5 TABLET, FILM COATED ORAL at 20:26

## 2025-08-02 ASSESSMENT — COGNITIVE AND FUNCTIONAL STATUS - GENERAL
WALKING IN HOSPITAL ROOM: 3 - A LITTLE
AFFECT: WFL
CLIMB 3 TO 5 STEPS WITH RAILING: 1 - TOTAL
DRESSING REGULAR LOWER BODY CLOTHING: 1 - TOTAL
CLIMB 3 TO 5 STEPS WITH RAILING: 2 - A LOT
DRESSING REGULAR UPPER BODY CLOTHING: 3 - A LITTLE
STANDING UP FROM CHAIR USING ARMS: 3 - A LITTLE
STANDING UP FROM CHAIR USING ARMS: 3 - A LITTLE
HELP NEEDED FOR BATHING: 2 - A LOT
EATING MEALS: 4 - NONE
HELP NEEDED FOR PERSONAL GROOMING: 3 - A LITTLE
MOVING TO AND FROM BED TO CHAIR: 3 - A LITTLE
WALKING IN HOSPITAL ROOM: 2 - A LOT
CLIMB 3 TO 5 STEPS WITH RAILING: 1 - TOTAL
TOILETING: 2 - A LOT
CLIMB 3 TO 5 STEPS WITH RAILING: 2 - A LOT
WALKING IN HOSPITAL ROOM: 3 - A LITTLE
STANDING UP FROM CHAIR USING ARMS: 3 - A LITTLE
STANDING UP FROM CHAIR USING ARMS: 3 - A LITTLE
AFFECT: WFL
WALKING IN HOSPITAL ROOM: 2 - A LOT
MOVING TO AND FROM BED TO CHAIR: 3 - A LITTLE

## 2025-08-02 NOTE — PROGRESS NOTES
Hospital Medicine Service  Daily Progress Note       SUBJECTIVE     Patient seen earlier today.  Sitting up in the chair.  Denied any lightheadedness or dizziness..  Reports a lot of pain in the left knee.      OBJECTIVE        Vital Signs  Temp:  [36.3 °C (97.4 °F)-37.2 °C (99 °F)] 36.6 °C (97.9 °F)  Heart Rate:  [72-98] 89  Resp:  [15-20] 18  BP: (100-139)/(50-68) 102/58     SpO2 Readings from Last 3 Encounters:   08/02/25 99%   07/29/25 97%       I/O last 3 completed shifts:  In: 1899.3 [P.O.:360; I.V.:1339.3; IV Piggyback:200]  Out: 925 [Urine:900; Blood:25]    PHYSICAL EXAMINATION        GEN:; not in acute distress  HEENT: normocephalic; atraumatic    CARDIO: regular rate and rhythm;   RESP: decreased at bases  ABD: soft,  non-tender, normal bowel sounds  EXT: Left knee dressing present  NEURO: Awake alert oriented x 3, nonfocal       LABS / IMAGING / TELE        Labs  Lab Results   Component Value Date    WBC 8.95 08/01/2025    HGB 10.6 (L) 08/01/2025    HCT 32.9 (L) 08/01/2025    MCV 97.1 08/01/2025     08/01/2025     Lab Results   Component Value Date    GLUCOSE 196 (H) 08/01/2025    CALCIUM 8.2 (L) 08/01/2025     08/01/2025    K 4.1 08/01/2025    CO2 24 08/01/2025     08/01/2025    BUN 16 08/01/2025    CREATININE 1.0 08/01/2025     Lab Results   Component Value Date    INR 1.1 07/29/2025       Imaging  X-RAY KNEE LEFT 1 OR 2 VIEWS  Result Date: 8/1/2025  IMPRESSION: Status post total knee arthroplasty without immediate hardware complication.         ASSESSMENT AND PLAN      # s/p  left knee arthroplasty  placement as per orthopedics.  Tylenol around-the-clock, oxycodone and Dilaudid as needed    #Near syncope  RRT called last night as patient had near syncopal episode.  Patient was unable to urinate and possibly caused discomfort causing the near syncopal episode.  Patient had 700 on the bladder scan and improved after straight cath.    # Urinary retention  Continue bladder scan and  straight catheter more than 500.  Patient has been urinating since this morning.    # Chronic kidney disease stage 3a  Creatinine stable     #  history of AML  Diagnosed in 2021, she has allogenic stem cell transplant, now in remission  She is on Ivosidenib (TIBSOVO 250 mg a day) daily (nonformulary)  She will bring the medication from home, so resume on the day post surgically  Monitor cbc look for wbc count, rbc transfuse to keep>7 and platelets  Her oncologist at [place] kidney transplant okay with aspirin or DVT prophylaxis if needed   Follow up with oncology 3 months     #  h/o vasovagal syncope  Patient has multiple episodes of vasovagal syncope in the past  Avoid pain, stress as much as possible  Fall precautions  Ambulate to bathroom and monitor closely, do not leave her alone while using rest room     #  hypothyroidism   - continue home: levothyroxine tablet po daily     #  GERD  - continue home: famotidine 20 mg po BID     #  hyperlipidemia  continue Crestor    # Anemia: normocytic  Acute blood loss anemia expected postop. Monitor     VTE Assessment: Aspirin twice daily  Code Status: Full Code  Estimated discharge date: 8/3/2025     Rey Almonte MD  8/2/2025  4:09 PM

## 2025-08-02 NOTE — NURSING NOTE
"Pt assisted to BSC Ax2 RW. Pt became pale and verbalized \"I don't feel good.\" Lethargic but oriented x4. Manual /50. Pt assisted back to bed, placed in trendelenburg position. RRT called. Bladder scanned for 750. Str cath'd for 900. [facility] placed tele order. BP back up to 122/57. Transferred to  on Zoll monitor w/ belongings.   "

## 2025-08-02 NOTE — PLAN OF CARE
".     Hospital Medicine    RRT or Code Blue Note       SUMMARY OF EVENT   This is a 75 y.o. year-old female who was admitted on 8/1/2025 with  Osteoarthritis of left knee, unspecified osteoarthritis type [M17.12]  Localized osteoarthritis of left knee [M17.12].  I was called to the room by overhead page for a rapid response at 11:51 PM.  Upon arrival at bedside, I was informed that a rapid response was called for lethargy and weakness while moving to the bathroom.  Upon arrival at bedside, the patient appeared to be somewhat pale but had vital signs within normal limits.  The patient easily woke to speech and informed me that she was feeling \"awake\" (though she appeared to be quite lethargic) and that she recently tried to urinate but had difficulty doing so.  It does not appear that the patient has recently been administered any sedating medications. The patient's heart and lung exam was normal. Twelve-lead EKG performed at bedside showed sinus rhythm at 79 bpm.  At this time, I have some suspicion that the patient may have had a near syncope associated with attempted urination given her history of vagal syncope though with her recent surgical status, I will also be checking a BMP and CBC to look for other causes.  A bladder scan will be performed to evaluate for urinary retention. I do not believe any imaging studies are warranted at this time.  The patient will be started on cardiac monitoring and transferred to a telemetry unit for continuous monitoring.      Addendum: Bladder scan showed that the patient was retaining urine with 751 ml.  Straight catheterization will be performed.       CODE STATUS      Their Code Status at the start of the event was Full Code      CODE LEADER   Pradip Cole, DO       PERTINENT PHYSICAL EXAMINATION      Pre-Event Vital Signs: Temp:  [36.2 °C (97.1 °F)-36.9 °C (98.4 °F)] 36.9 °C (98.4 °F)  Heart Rate:  [64-98] 83  Resp:  [12-20] 18  BP: (113-158)/(55-71) 127/60    Physical " Exam  Constitutional:       Appearance: She is ill-appearing.   HENT:      Head: Normocephalic and atraumatic.      Nose: No congestion or rhinorrhea.      Mouth/Throat:      Mouth: Mucous membranes are moist.      Pharynx: Oropharynx is clear.   Eyes:      General: No scleral icterus.     Conjunctiva/sclera: Conjunctivae normal.   Cardiovascular:      Rate and Rhythm: Normal rate and regular rhythm.      Heart sounds: No murmur heard.     No friction rub. No gallop.   Pulmonary:      Breath sounds: No wheezing, rhonchi or rales.   Abdominal:      General: Abdomen is flat.      Palpations: Abdomen is soft.   Musculoskeletal:      Comments: Left lower extremity wrapped in bandages   Skin:     Coloration: Skin is pale.   Neurological:      Comments: Lethargic but able to open eyes and converse without difficulty.          SIGNIFICANT LABS / IMAGING      Labs  BMP and CBC pending    Imaging  Not applicable    ECG/Telemetry  EKG showed sinus rhythm at 79 bpm; the patient will be started on continuous cardiac monitoring      POST-EVENT      Diagnosis: Near syncope    Patient condition at end of event: Stable, improving    Disposition: Transfer to telemetry unit.     KEY COMMUNICATION      Discussed with on-call orthopedic surgery resident.    Discussed with Emergency Contact: Extended Emergency Contact Information  Primary Emergency Contact: JordyninErin  Mobile Phone: [phone]  Relation: Daughter  Secondary Emergency Contact: Thomas Hernandez  Mobile Phone: [phone]  Relation: Son  Preferred language: English   needed? No: Yes/No: no    Follow-Up/Handoff: Follow up BMP and CBC

## 2025-08-02 NOTE — PROGRESS NOTES
Physical Therapy -  Daily Treatment/Progress Note     Patient: Dorothy Hernandez  Location: [address]  MRN: [id number]  Today's date: 8/2/2025    HISTORY OF PRESENT ILLNESS     Dorothy is a 75 y.o. female admitted on 8/1/2025 with Osteoarthritis of left knee, unspecified osteoarthritis type [M17.12]  Localized osteoarthritis of left knee [M17.12]. Principal problem is No Principal Problem: There is no principal problem currently on the Problem List. Please update the Problem List and refresh..    Past Medical History  Dorothy has a past medical history of AML (acute myeloblastic leukemia) (CMS/HCC), Hypothyroidism, MVP (mitral valve prolapse), and Seasonal allergies.    History of Present Illness   Patient is now s/p L TKA with Dr. Manzano on 8/1/25    PRIOR LEVEL OF FUNCTION AND LIVING ENVIRONMENT     Prior Level of Function      Flowsheet Row Most Recent Value   Dominant Hand right   Ambulation assistive equipment   Transferring assistive equipment   Toileting independent   Bathing independent   Dressing independent   Eating independent   IADLs independent   Driving/Transportation    Communication understands/communicates without difficulty   Prior Level of Function Comment Reports being IND with all mobility and ADLs however recently with knee pain has been using RW   Assistive Device Currently Used at Home walker, front-wheeled        Prior Living Environment      Flowsheet Row Most Recent Value   People in Home alone   Current Living Arrangements apartment   Living Environment Comment Alone in apt with 0STE. Tub shower with SC and GBs. Son is able to help a few hours per day       VITALS AND PAIN     PT Vitals      Date/Time Pulse HR Source SpO2 Pt Activity O2 Therapy BP BP Location BP Method Pt Position [facility]   08/02/25 0920 76 Monitor 98 % At rest None (Room air) 118/56 Right upper arm Automatic Lying AC   08/02/25 0930 -- -- -- -- -- 112/56 Right upper arm Automatic Sitting AC   08/02/25  0938 79 Monitor 97 % At rest None (Room air) 117/56 Right upper arm Automatic Sitting AC          PT Pain      Date/Time Pain Type Side/Orientation Location Rating: Rest Rating: Activity Interventions [facility]   08/02/25 0920 Pain Assessment left knee 2 - mild pain 6 - moderate-severe pain care clustered AC               Objective     OBJECTIVE     Start time:  0916  End time:  0941  Session Length: 25 min  Mode of Treatment: co-treatment  Reason for co-treatment: limited tolerance, PT focus on functional mobility    General Observations  Patient received upright, in bed. She was no issues or concerns identified by nurse prior to session, agreeable to therapy. received semi-reclined + tele + LLE ace wrapped in NAD.    Precautions: fall, weight bearing  Extremity Weight-Bearing Status: left lower extremity  Left LE Weight-Bearing Status: weight-bearing as tolerated (WBAT)           PT Eval and Treat - 08/02/25 0916          Cognition    Orientation Status oriented x 3     Affect/Mental Status WFL     Follows Commands follows one-step commands;over 90% accuracy;increased processing time needed;initiation impaired;physical/tactile prompts required;repetition of directions required;verbal cues/prompting required     Cognitive Function executive function deficit;safety deficit     Comment, Cognition pleasant and cooperative, able to make needs known, pain limiting, cues for motor planning        Bed Mobility    Bed Mobility Activities left;supine to sit     Bland maximum assist (25-49% patient effort);1 person assist     Safety/Cues increased time to complete     Assistive Device bed rails;head of bed elevated     Comment long sit > short sit with assist for LLE and trunk.  Mod A to scoot to EOB with use of draw sheet.  Static sit is supervision        Mobility Belt    Mobility Belt Used During Session yes        Sit/Stand Transfer    Surface edge of bed     Bland close supervision;minimum assist (75% or more  patient effort)     Safety/Cues increased time to complete;technique;hand placement     Assistive Device walker, front-wheeled     Transfer Comments STS from EOB to RW with cues for hand placement and positioning pre-transfer.  CS from EOB, Min A from BSC due to lower seat.  No buckling or LOB observed        Gait Training    Hope, Gait minimum assist (75% or more patient effort);1 person assist     Safety/Cues increased time to complete;sequencing     Assistive Device walker, front-wheeled     Distance in Feet 8 feet   x 2    Pattern step-to     Deviations/Abnormal Patterns antalgic;gait speed decreased;step length decreased;weight shifting decreased     Comment level surface, step-to gait with increased WB through BUE when progressing RLE.  Occasional VCs for sequencing.  Distance limited by pain        Balance    Static Sitting Balance WFL;sitting, edge of bed     Dynamic Sitting Balance WFL;sitting, edge of bed     Sit to Stand Dynamic Balance mild impairment;standing;supported     Static Standing Balance WFL;standing;supported     Dynamic Standing Balance mild impairment;standing;supported     Comment, Balance with RW        Impairments/Safety Issues    Impairments Affecting Function balance;motor planning;strength;pain;endurance/activity tolerance     Functional Endurance fair, limited by pain     Safety Issues Affecting Function judgment;sequencing abilities;problem-solving                                              Education Documentation  Joint Mobility/Strength, taught by Susanne Dill, PT at 8/2/2025  1:18 PM.  Learner: Patient  Readiness: Acceptance  Method: Explanation  Response: Verbalizes Understanding  Comment: discharge planning    Energy Conservation, taught by Susanne Dill, PT at 8/2/2025  1:18 PM.  Learner: Patient  Readiness: Acceptance  Method: Explanation  Response: Verbalizes Understanding  Comment: discharge planning        Session Outcome  Patient in chair, reclined at end of  session, chair alarm on, all needs met, call light in reach, personal items in reach. Nursing notified about patient's performance, patient's position, and patient's response to therapy/activity.    AM-PAC™ - Mobility (Current Function)     Turning form your back to your side while in flat bed without using bedrails 3 - A Little   Moving from lying on your back to sitting on the side of a flat bed without using bedrails 2 - A Lot   Moving to and from a bed to a chair 3 - A Little   Standing up from a chair using your arms 3 - A Little   To walk in a hospital room 3 - A Little   Climbing 3-5 steps with a railing 2 - A Lot   AM-PAC™ Mobility Score 16          ASSESSMENT AND PLAN     Progress Summary  Pt with mildly improved tolerance of therapy session.  Required Max A x 1 for bed mobility due to pain and weakness in LLE and poor trunk engagement.  Once upright, she was able to perform STS to RW with close supervision from EOB.  PT ambulated x 8 ft with RW and Min A to BSC.  Required increased assist to stand from BSC due to low surface.  Ambulation distance limited overall due to report of increasing pain with activity.  Pt dispo updated to SNF given she is below her functional baseline and requires assist with all mobility.  Will continue to encourage movement while in acute care to maximize independence where able.  Team and CM notified.  [facility] is 16/24    Patient/Family Therapy Goals Statement: To go home    PT Plan      Flowsheet Row Most Recent Value   Rehab Potential good, to achieve stated therapy goals at 08/01/2025 1746   Therapy Frequency 5 times/wk at 08/01/2025 1746   Planned Therapy Interventions balance training, bed mobility training, gait training, home exercise program, patient/family education, strengthening, ROM (range of motion), transfer training at 08/01/2025 1746       PT Discharge Recommendations      Flowsheet Row Most Recent Value   PT Recommended Discharge Disposition skilled nursing  facility at 08/02/2025 0916   Anticipated Equipment Needs if Discharged Home (PT) none at 08/01/2025 1746            PT Goals      Flowsheet Row Most Recent Value   Bed Mobility Goal 1    Activity/Assistive Device bed mobility activities, all at 08/01/2025 1746   [place] modified independence at 08/01/2025 1746   Time Frame by discharge at 08/01/2025 1746   Progress/Outcome goal ongoing at 08/01/2025 1746   Transfer Goal 1    Activity/Assistive Device all transfers, walker, front-wheeled at 08/01/2025 1746   [place] modified independence at 08/01/2025 1746   Time Frame by discharge at 08/01/2025 1746   Progress/Outcome goal ongoing at 08/01/2025 1746   Gait Training Goal 1    Activity/Assistive Device gait (walking locomotion), walker, front-wheeled at 08/01/2025 1746   [place] modified independence at 08/01/2025 1746   Distance 75 at 08/01/2025 1746   Time Frame by discharge at 08/01/2025 1746   Progress/Outcome goal ongoing at 08/01/2025 1746

## 2025-08-02 NOTE — PLAN OF CARE
Care Coordination Discharge Plan Note     Discharge Needs Assessment  Concerns to be Addressed:    Current Discharge Risk:      Anticipated Discharge Plan  Anticipated Discharge Disposition:         Patient Choice  Offered/Gave Vendor List:         ---------------------------------------------------------------------------------------------------------------------    Interdisciplinary Discharge Plan Review:  Participants:social work/services, patient, advanced practice provider, occupational therapy, physical therapy    Concerns Comments: EILEEN notified by PT/OT that pt rec. for SNF due to limmited ambulation. EILEEN spoke with Ortho Resident who spoke with MD who is agreeable to SNF. Pt admitted to IP. EILEEN met with pt at bedside to discuss d/c plan. Pt agreeable to SNF. EILEEN provided pt with choice list to review. Pt requested facility near her home in [facility] or her dtrs home in [place]. SW sent referrals. Care Coordination continues to follow.    Addendum 1608: EILEEN met with pt at bedside. Pt requesting SN[facility] referrals to [facility] in [facility]. EILEEN sent referrals.     Discharge Plan:   Disposition/Destination: Skilled Nursing Facility - Other  / Skilled Nursing Facility  Discharge Facility:    Community Resources:      Discharge Transportation:  Is Out of Hospital DNR needed at Discharge:    Does patient need discharge transport?

## 2025-08-02 NOTE — PLAN OF CARE
Problem: Knee Arthroplasty  Goal: Optimal Functional Ability  Outcome: Progressing     Problem: Adult Inpatient Plan of Care  Goal: Plan of Care Review  Outcome: Progressing  Flowsheets (Taken 8/2/2025 1500)  Progress: improving  Outcome Evaluation: OT eval complete  Plan of Care Reviewed With: patient

## 2025-08-02 NOTE — PROGRESS NOTES
Occupational Therapy -  Initial Evaluation     Patient: Dorothy Hernandez  Location: [facility] 4A 4026  MRN: [id number]  Today's date: 8/2/2025    HISTORY OF PRESENT ILLNESS     Dorothy is a 75 y.o. female admitted on 8/1/2025 with Osteoarthritis of left knee, unspecified osteoarthritis type [M17.12]  Localized osteoarthritis of left knee [M17.12]. Principal problem is No Principal Problem: There is no principal problem currently on the Problem List. Please update the Problem List and refresh..    Past Medical History  Dorothy has a past medical history of AML (acute myeloblastic leukemia) (CMS/HCC), Hypothyroidism, MVP (mitral valve prolapse), and Seasonal allergies.    History of Present Illness   Patient is now s/p L TKA with Dr. Manzano on 8/1/25    PRIOR LEVEL OF FUNCTION AND LIVING ENVIRONMENT     Prior Level of Function      Flowsheet Row Most Recent Value   Dominant Hand right   Ambulation assistive equipment   Transferring assistive equipment   Toileting independent   Bathing independent   Dressing independent   Eating independent   IADLs independent   Driving/Transportation    Communication understands/communicates without difficulty   Prior Level of Function Comment Reports being IND with all mobility and ADLs however recently with knee pain has been using RW   Assistive Device Currently Used at Home walker, front-wheeled        Prior Living Environment      Flowsheet Row Most Recent Value   People in Home alone   Current Living Arrangements apartment   Living Environment Comment Alone in apt with 0STE. Tub shower with SC and GBs. Son is able to help a few hours per day     Occupational Profile      Flowsheet Row Most Recent Value   Successful Occupations retired   Occupational History/Life Experiences IND / mod I PTA   Environmental Supports and Barriers lives alone, reports has a supportive neighbor       VITALS AND PAIN     OT Vitals      Date/Time Pulse HR Source SpO2 Pt Activity O2  Therapy BP BP Location BP Method Pt Position [facility]   08/02/25 0920 76 Monitor 98 % At rest None (Room air) 118/56 Right upper arm Automatic Lying AC   08/02/25 0930 -- -- -- -- -- 112/56 Right upper arm Automatic Sitting    08/02/25 0938 79 Monitor 97 % At rest None (Room air) 117/56 Right upper arm Automatic Sitting AC          OT Pain      Date/Time Pain Type Side/Orientation Location Rating: Rest Rating: Activity Interventions [facility]   08/02/25 0920 Pain Assessment knee left 2 - mild pain 6 - moderate-severe pain care clustered AC               Objective     OBJECTIVE     Start time:  0910  End time:  0938  Session Length: 28 min  Mode of Treatment: co-treatment  Reason for co-treatment: limited activity tolerance,  OT assessing ADLs    General Observations  Patient received in bed. She was agreeable to therapy, no issues or concerns identified by nurse prior to session. LLE ace wrapped    Precautions: fall, weight bearing  Extremity Weight-Bearing Status: left lower extremity  Left LE Weight-Bearing Status: weight-bearing as tolerated (WBAT)    Limitations/Impairments: safety/cognitive   Services  Is an  Needed/Used?: No      OT Eval and Treat - 08/02/25 0910          Cognition    Orientation Status oriented x 3     Affect/Mental Status WFL     Follows Commands follows one-step commands;over 90% accuracy;increased processing time needed;initiation impaired;physical/tactile prompts required;repetition of directions required;verbal cues/prompting required     Cognitive Function executive function deficit;safety deficit     Executive Function Deficit information processing;problem-solving/reasoning;planning/decision-making;self-monitoring/self-correction     Safety Deficit awareness of need for assistance;insight into deficits/self-awareness     Comment, Cognition cooperative with care, VCs for pacing and safety throughout. decreased insight, pain limiting, self limiting due to pain benefits from  encouragement throughout     Cognitive Interventions/Strategies occupation/activity based interventions        Vision Assessment/Intervention    Vision Assessment WFL;corrective lenses for reading        Hearing Assessment    Hearing Status WFL        Sensory Assessment    Sensory Assessment sensation intact, upper extremities        Upper Extremity Assessment    UE Assessment ROM and Strength WFL        Bed Mobility    Bed Mobility Activities supine to sit;left     [place] maximum assist (25-49% patient effort);1 person assist     Safety/Cues verbal cues;increased time to complete     Assistive Device head of bed elevated;bed rails     Comment assist to advance LLE and at trunk, increased time with rest breaks throughout transfer due to pain        Mobility Belt    Mobility Belt Used During Session yes        Sit/Stand Transfer    Surface edge of bed     [place] close supervision     Safety/Cues verbal cues;sequencing;technique;hand placement;increased time to complete     Assistive Device walker, front-wheeled     Transfer Comments slow to rise, slightly unsteady but no LOB noted        Toilet Transfer    Transfer Technique sit/stand     [place] minimum assist (75% or more patient effort);1 person assist     Safety/Cues verbal cues;sequencing;technique;hand placement     Assistive Device commode, 3-in-1;walker, front-wheeled        Functional Mobility    Distance in room/bathroom     Functional Mobility [place] minimum assist (75% or more patient effort);1 person assist     Safety/Cues verbal cues;proper use of assistive device;technique;sequencing     Assistive Device walker, front-wheeled     Functional Mobility Comments EOB > commode then commode > recliner, short steps, cues for sequencing and WBing through LLE        Lower Body Dressing    Tasks socks     [place] dependent (less than 25% patient effort)     Position supported sitting     Comment pain limiting        Toileting     Comment attempts to void on commode, unsuccessful        Balance    Static Sitting Balance WFL;sitting, edge of bed     Dynamic Sitting Balance WFL;sitting, edge of bed     Sit to Stand Dynamic Balance mild impairment     Static Standing Balance WFL     Dynamic Standing Balance mild impairment;supported     Comment, Balance RW        Impairments/Safety Issues    Impairments Affecting Function balance;motor planning;strength;pain;endurance/activity tolerance     Functional Endurance fair- pain limiting     Safety Issues Affecting Function judgment;sequencing abilities;problem-solving                     Functional Reach Test  Trial One: Functional Reach Test (in): 0 inches (min A static standing unable to trial at this time)  Trial Two: Functional Reach Test (in): 0 inches  Average (in): 0 inches                        Education Documentation  Rehabilitation Therapy, taught by Latia Valles OT at 8/2/2025  3:00 PM.  Learner: Patient  Readiness: Acceptance  Method: Explanation  Response: Verbalizes Understanding, Needs Reinforcement  Comment: OT POC, safety/pacing, seated ADLs, WBAT LLE, OOB with RN staff, d/c recs        Session Outcome  Patient reclined, in chair at end of session, chair alarm on, all needs met, call light in reach, personal items in reach. Nursing notified about patient's performance, patient's position, patient's response to therapy/activity, and change in vital signs.    AM-PAC™ - ADL (Current Function)     Putting on/taking off regular lower body clothing 1 - Total   Bathing 2 - A Lot   Toileting 2 - A Lot   Putting on/taking off regular upper body clothing 3 - A Little   Help for taking care of personal grooming 3 - A Little   Eating meals 4 - None   AM-PAC™ ADL Score 15          ASSESSMENT AND PLAN     Progress Summary  OT eval complete. [facility]  15. Patient presents s/p L TKA . During OT eval Pt completes bed mobility with max A x1. Pt completes STS,commode transfer and functional mobility  with close sup/min A using RW  .Pt completes LB dressing with total A . Pt is limited at this time by post op pain and deficits in balance, strength and endurance. Pt would benefit from cont OT to maximize IND c ADLs and safe functional mobility. Rec d/c to SNF when medically stable.    Patient/Family Therapy Goal Statement: less pain    OT Plan      Flowsheet Row Most Recent Value   Rehab Potential good, to achieve stated therapy goals at 08/02/2025 0910   Therapy Frequency 5 times/wk at 08/02/2025 0910   Planned Therapy Interventions activity tolerance training, adaptive equipment training, functional balance retraining, occupation/activity based interventions, ROM/therapeutic exercise, strengthening exercise, transfer/mobility retraining, BADL retraining at 08/02/2025 0910       OT Discharge Recommendations      Flowsheet Row Most Recent Value   OT Recommended Discharge Disposition skilled nursing facility at 08/02/2025 0910   Anticipated Equipment Needs if Discharged Home (OT) commode, 3-in-1, walker, front-wheeled, dressing equipment at 08/02/2025 0910            OT Goals      Flowsheet Row Most Recent Value   Bed Mobility Goal 1    Activity/Assistive Device bed mobility activities, all at 08/02/2025 0910   [place] modified independence at 08/02/2025 0910   Time Frame by discharge at 08/02/2025 0910   Progress/Outcome goal ongoing at 08/02/2025 0910   Transfer Goal 1    Activity/Assistive Device toilet at 08/02/2025 0910   [place] modified independence at 08/02/2025 0910   Time Frame by discharge at 08/02/2025 0910   Progress/Outcome goal ongoing at 08/02/2025 0910   Dressing Goal 1    Activity/Adaptive Equipment dressing skills, all at 08/02/2025 0910   [place] modified independence at 08/02/2025 0910   Time Frame by discharge at 08/02/2025 0910   Progress/Outcome goal ongoing at 08/02/2025 0910   Toileting Goal 1    Activity/Assistive Device toileting skills, all at 08/02/2025 0910    [place] modified independence at 08/02/2025 0910   Time Frame by discharge at 08/02/2025 0910   Progress/Outcome goal ongoing at 08/02/2025 0910

## 2025-08-03 VITALS
SYSTOLIC BLOOD PRESSURE: 133 MMHG | WEIGHT: 160.9 LBS | TEMPERATURE: 98.2 F | DIASTOLIC BLOOD PRESSURE: 62 MMHG | BODY MASS INDEX: 25.86 KG/M2 | RESPIRATION RATE: 18 BRPM | HEART RATE: 104 BPM | HEIGHT: 66 IN | OXYGEN SATURATION: 96 %

## 2025-08-03 LAB
ANION GAP SERPL CALC-SCNC: 5 MEQ/L (ref 3–15)
BUN SERPL-MCNC: 12 MG/DL (ref 7–25)
CALCIUM SERPL-MCNC: 7.9 MG/DL (ref 8.6–10.3)
CHLORIDE SERPL-SCNC: 108 MEQ/L (ref 98–107)
CO2 SERPL-SCNC: 24 MEQ/L (ref 21–31)
CREAT SERPL-MCNC: 0.8 MG/DL (ref 0.6–1.2)
EGFRCR SERPLBLD CKD-EPI 2021: >60 ML/MIN/1.73M*2
ERYTHROCYTE [DISTWIDTH] IN BLOOD BY AUTOMATED COUNT: 14.6 % (ref 11.7–14.4)
GLUCOSE SERPL-MCNC: 133 MG/DL (ref 70–99)
HCT VFR BLD AUTO: 27 % (ref 35–45)
HGB BLD-MCNC: 8.8 G/DL (ref 11.8–15.7)
MAGNESIUM SERPL-MCNC: 1.9 MG/DL (ref 1.8–2.5)
MCH RBC QN AUTO: 31.4 PG (ref 28–33.2)
MCHC RBC AUTO-ENTMCNC: 32.6 G/DL (ref 32.2–35.5)
MCV RBC AUTO: 96.4 FL (ref 83–98)
PLATELET # BLD AUTO: 156 K/UL (ref 150–369)
PMV BLD AUTO: 8.6 FL (ref 9.4–12.3)
POTASSIUM SERPL-SCNC: 4 MEQ/L (ref 3.5–5.1)
RBC # BLD AUTO: 2.8 M/UL (ref 3.93–5.22)
SODIUM SERPL-SCNC: 137 MEQ/L (ref 136–145)
WBC # BLD AUTO: 8.84 K/UL (ref 3.8–10.5)

## 2025-08-03 PROCEDURE — 85027 COMPLETE CBC AUTOMATED: CPT | Performed by: HOSPITALIST

## 2025-08-03 PROCEDURE — 97535 SELF CARE MNGMENT TRAINING: CPT | Mod: GO,U8

## 2025-08-03 PROCEDURE — 83735 ASSAY OF MAGNESIUM: CPT | Performed by: HOSPITALIST

## 2025-08-03 PROCEDURE — 97116 GAIT TRAINING THERAPY: CPT | Mod: GP,U8

## 2025-08-03 PROCEDURE — 80048 BASIC METABOLIC PNL TOTAL CA: CPT | Performed by: HOSPITALIST

## 2025-08-03 PROCEDURE — 36415 COLL VENOUS BLD VENIPUNCTURE: CPT | Performed by: HOSPITALIST

## 2025-08-03 PROCEDURE — 99232 SBSQ HOSP IP/OBS MODERATE 35: CPT | Performed by: HOSPITALIST

## 2025-08-03 PROCEDURE — 63700000 HC SELF-ADMINISTRABLE DRUG: Performed by: NURSE PRACTITIONER

## 2025-08-03 PROCEDURE — 20600000 HC ROOM AND CARE INTERMEDIATE/TELEMETRY

## 2025-08-03 PROCEDURE — 63700000 HC SELF-ADMINISTRABLE DRUG: Performed by: ORTHOPAEDIC SURGERY

## 2025-08-03 RX ADMIN — OXYCODONE HYDROCHLORIDE 10 MG: 5 TABLET ORAL at 21:26

## 2025-08-03 RX ADMIN — FAMOTIDINE 20 MG: 20 TABLET, FILM COATED ORAL at 08:50

## 2025-08-03 RX ADMIN — OXYCODONE HYDROCHLORIDE 10 MG: 5 TABLET ORAL at 00:28

## 2025-08-03 RX ADMIN — ASPIRIN 81 MG: 81 TABLET, COATED ORAL at 08:50

## 2025-08-03 RX ADMIN — DOCUSATE SODIUM 100 MG: 100 CAPSULE, LIQUID FILLED ORAL at 08:50

## 2025-08-03 RX ADMIN — VALACYCLOVIR HYDROCHLORIDE 500 MG: 1 TABLET, FILM COATED ORAL at 21:27

## 2025-08-03 RX ADMIN — ASPIRIN 81 MG: 81 TABLET, COATED ORAL at 21:27

## 2025-08-03 RX ADMIN — OXYCODONE HYDROCHLORIDE 10 MG: 5 TABLET ORAL at 04:28

## 2025-08-03 RX ADMIN — POLYETHYLENE GLYCOL 3350 17 G: 17 POWDER, FOR SOLUTION ORAL at 08:50

## 2025-08-03 RX ADMIN — OXYCODONE HYDROCHLORIDE 10 MG: 5 TABLET ORAL at 15:45

## 2025-08-03 RX ADMIN — VALACYCLOVIR HYDROCHLORIDE 500 MG: 1 TABLET, FILM COATED ORAL at 08:50

## 2025-08-03 RX ADMIN — ACETAMINOPHEN 650 MG: 325 TABLET ORAL at 18:40

## 2025-08-03 RX ADMIN — DOCUSATE SODIUM AND SENNOSIDES 1 TABLET: 8.6; 5 TABLET, FILM COATED ORAL at 08:50

## 2025-08-03 RX ADMIN — DOCUSATE SODIUM AND SENNOSIDES 1 TABLET: 8.6; 5 TABLET, FILM COATED ORAL at 21:27

## 2025-08-03 RX ADMIN — LEVOTHYROXINE SODIUM 125 MCG: 0.12 TABLET ORAL at 06:26

## 2025-08-03 RX ADMIN — ACETAMINOPHEN 650 MG: 325 TABLET ORAL at 06:26

## 2025-08-03 RX ADMIN — DOCUSATE SODIUM 100 MG: 100 CAPSULE, LIQUID FILLED ORAL at 21:27

## 2025-08-03 RX ADMIN — OXYCODONE HYDROCHLORIDE 10 MG: 5 TABLET ORAL at 08:50

## 2025-08-03 RX ADMIN — FAMOTIDINE 20 MG: 20 TABLET, FILM COATED ORAL at 21:27

## 2025-08-03 RX ADMIN — ROSUVASTATIN 10 MG: 10 TABLET, FILM COATED ORAL at 21:27

## 2025-08-03 RX ADMIN — ACETAMINOPHEN 650 MG: 325 TABLET ORAL at 00:29

## 2025-08-03 RX ADMIN — ACETAMINOPHEN 650 MG: 325 TABLET ORAL at 12:35

## 2025-08-03 ASSESSMENT — COGNITIVE AND FUNCTIONAL STATUS - GENERAL
AFFECT: WFL
STANDING UP FROM CHAIR USING ARMS: 3 - A LITTLE
MOVING TO AND FROM BED TO CHAIR: 3 - A LITTLE
STANDING UP FROM CHAIR USING ARMS: 3 - A LITTLE
WALKING IN HOSPITAL ROOM: 3 - A LITTLE
HELP NEEDED FOR BATHING: 2 - A LOT
STANDING UP FROM CHAIR USING ARMS: 3 - A LITTLE
CLIMB 3 TO 5 STEPS WITH RAILING: 2 - A LOT
CLIMB 3 TO 5 STEPS WITH RAILING: 2 - A LOT
TOILETING: 2 - A LOT
EATING MEALS: 4 - NONE
CLIMB 3 TO 5 STEPS WITH RAILING: 2 - A LOT
WALKING IN HOSPITAL ROOM: 3 - A LITTLE
DRESSING REGULAR LOWER BODY CLOTHING: 1 - TOTAL
WALKING IN HOSPITAL ROOM: 3 - A LITTLE
MOVING TO AND FROM BED TO CHAIR: 3 - A LITTLE
HELP NEEDED FOR PERSONAL GROOMING: 3 - A LITTLE
DRESSING REGULAR UPPER BODY CLOTHING: 3 - A LITTLE
MOVING TO AND FROM BED TO CHAIR: 3 - A LITTLE

## 2025-08-03 NOTE — PROGRESS NOTES
Physical Therapy -  Daily Treatment/Progress Note     Patient: Dorothy Hernandez  Location: [address] 4026  MRN: [id number]  Today's date: 8/3/2025    HISTORY OF PRESENT ILLNESS     Dorothy is a 75 y.o. female admitted on 8/1/2025 with Osteoarthritis of left knee, unspecified osteoarthritis type [M17.12]  Localized osteoarthritis of left knee [M17.12]. Principal problem is No Principal Problem: There is no principal problem currently on the Problem List. Please update the Problem List and refresh..    Past Medical History  Dorothy has a past medical history of AML (acute myeloblastic leukemia) (CMS/HCC), Hypothyroidism, MVP (mitral valve prolapse), and Seasonal allergies.    History of Present Illness   Patient is now s/p L TKA with Dr. Manzano on 8/1/25    PRIOR LEVEL OF FUNCTION AND LIVING ENVIRONMENT     Prior Level of Function      Flowsheet Row Most Recent Value   Dominant Hand right   Ambulation assistive equipment   Transferring assistive equipment   Toileting independent   Bathing independent   Dressing independent   Eating independent   IADLs independent   Driving/Transportation    Communication understands/communicates without difficulty   Prior Level of Function Comment Reports being IND with all mobility and ADLs however recently with knee pain has been using RW   Assistive Device Currently Used at Home walker, front-wheeled        Prior Living Environment      Flowsheet Row Most Recent Value   People in Home alone   Current Living Arrangements apartment   Living Environment Comment Alone in apt with 0STE. Tub shower with SC and GBs. Son is able to help a few hours per day       VITALS AND PAIN     PT Vitals      Date/Time Pulse HR Source SpO2 Pt Activity O2 Therapy BP BP Location BP Method Pt Position Who   08/03/25 0950 100 Monitor 96 % At rest None (Room air) 105/53 Right upper arm Automatic Sitting RLP   08/03/25 1000 98 Monitor -- -- -- 100/55 Right upper arm Automatic Sitting DJV           PT Pain      Date/Time Pain Type Side/Orientation Location Rating: Rest Rating: Activity Interventions [facility]   08/03/25 0950 Pain Assessment left knee 5 10 care clustered RLP               Objective     OBJECTIVE     Start time:  0948  End time:  1008  Session Length: 20 min  Mode of Treatment: co-treatment  Reason for co-treatment: d/c planning, limited activity tolerance, PT focus on mobility.    General Observations  Patient received upright (sitting on BSC). She was agreeable to therapy, no issues or concerns identified by nurse prior to session. Received sitting up on BSC.    Precautions: fall, weight bearing  Extremity Weight-Bearing Status: left lower extremity  Left LE Weight-Bearing Status: weight-bearing as tolerated (WBAT)           PT Eval and Treat - 08/03/25 0948          Cognition    Orientation Status oriented x 3     Affect/Mental Status WFL     Follows Commands follows one-step commands;over 90% accuracy;increased processing time needed;initiation impaired;physical/tactile prompts required;repetition of directions required;verbal cues/prompting required        Bed Mobility    Comment Rec' OOB throughout session.        Mobility Belt    Mobility Belt Used During Session yes        Sit/Stand Transfer    Surface --   BSC    [place] minimum assist (75% or more patient effort);1 person assist     Safety/Cues increased time to complete;minimal;verbal cues;tactile cues;proper use of assistive device;sequencing;technique     Assistive Device walker, front-wheeled     Transfer Comments Cues for hand placement with STS. Little weight placed on LLE with STS.        Gait Training    [place], Gait minimum assist (75% or more patient effort);1 person assist     Safety/Cues increased time to complete;minimal;verbal cues;tactile cues;proper use of assistive device;sequencing;technique     Assistive Device walker, front-wheeled     Distance in Feet 5 feet     Pattern step-to     Deviations/Abnormal  Patterns gait speed decreased;step length decreased;stride length decreased;weight shifting decreased     Comment Patient ambulate 5' today with RW and min Ax1. Deferred further ambulation.        Stairs Training    [place], Stairs not tested;unable to assess        Balance    Static Sitting Balance WFL;sitting in chair     Dynamic Sitting Balance mild impairment;supported;sitting in chair     Sit to Stand Dynamic Balance mild impairment;supported     Static Standing Balance mild impairment;supported;standing     Dynamic Standing Balance mild impairment;supported;standing     Balance Interventions occupation based/functional task     Comment, Balance supported = use of RW.                  10 Meter Walk Test (Self-Selected Velocity)  Trial One: Ten Meter Walk Test (sec):  (Unable to ambulate 10m for test)                 Education Documentation  Call Light Use, taught by Marco Velásquez, PT at 8/3/2025 12:06 PM.  Learner: Patient  Readiness: Acceptance  Method: Explanation  Response: Verbalizes Understanding  Comment: PT PLOC, use of call bell for assistance.        Session Outcome  Patient reclined, in chair at end of session, chair alarm on, all needs met, call light in reach, personal items in reach. Nursing notified about patient's performance, patient's position, and patient's response to therapy/activity.    AM-PAC™ - Mobility (Current Function)     Turning form your back to your side while in flat bed without using bedrails 3 - A Little   Moving from lying on your back to sitting on the side of a flat bed without using bedrails 3 - A Little   Moving to and from a bed to a chair 3 - A Little   Standing up from a chair using your arms 3 - A Little   To walk in a hospital room 3 - A Little   Climbing 3-5 steps with a railing 2 - A Lot   AM-PAC™ Mobility Score 17          ASSESSMENT AND PLAN     Progress Summary  PT follow up performed today. [facility] 17/24. Patient able to sit<>stand with RW and min Ax1.  Able to ambulate short distance with RW and min Ax1 - Ambulation distance limited due to pain in left knee. Patient would benefit from SNF once medically stable for D/C to increase strength, endurance, and prior level of function.    Patient/Family Therapy Goals Statement: To go home    PT Plan      Flowsheet Row Most Recent Value   Rehab Potential good, to achieve stated therapy goals at 08/01/2025 1746   Therapy Frequency 5 times/wk at 08/01/2025 1746   Planned Therapy Interventions balance training, bed mobility training, gait training, home exercise program, patient/family education, strengthening, ROM (range of motion), transfer training at 08/01/2025 1746       PT Discharge Recommendations      Flowsheet Row Most Recent Value   PT Recommended Discharge Disposition skilled nursing facility at 08/03/2025 0948   Anticipated Equipment Needs if Discharged Home (PT) none at 08/03/2025 0948            PT Goals      Flowsheet Row Most Recent Value   Bed Mobility Goal 1    Activity/Assistive Device bed mobility activities, all at 08/01/2025 1746   [place] modified independence at 08/01/2025 1746   Time Frame by discharge at 08/01/2025 1746   Progress/Outcome goal ongoing at 08/01/2025 1746   Transfer Goal 1    Activity/Assistive Device all transfers, walker, front-wheeled at 08/01/2025 1746   [place] modified independence at 08/01/2025 1746   Time Frame by discharge at 08/01/2025 1746   Progress/Outcome goal ongoing at 08/01/2025 1746   Gait Training Goal 1    Activity/Assistive Device gait (walking locomotion), walker, front-wheeled at 08/01/2025 1746   [place] modified independence at 08/01/2025 1746   Distance 75 at 08/01/2025 1746   Time Frame by discharge at 08/01/2025 1746   Progress/Outcome goal ongoing at 08/01/2025 1746

## 2025-08-03 NOTE — PROGRESS NOTES
Occupational Therapy -  Daily Treatment/Progress Note     Patient: Dorothy Hernandez  [facility]  MRN: [id number]  Today's date: 8/3/2025    HISTORY OF PRESENT ILLNESS     Dorothy is a 75 y.o. female admitted on 8/1/2025 with Osteoarthritis of left knee, unspecified osteoarthritis type [M17.12]  Localized osteoarthritis of left knee [M17.12]. Principal problem is No Principal Problem: There is no principal problem currently on the Problem List. Please update the Problem List and refresh..    Past Medical History  Dorothy has a past medical history of AML (acute myeloblastic leukemia) (CMS/HCC), Hypothyroidism, MVP (mitral valve prolapse), and Seasonal allergies.    History of Present Illness   Patient is now s/p L TKA with Dr. Manzano on 8/1/25    PRIOR LEVEL OF FUNCTION AND LIVING ENVIRONMENT     Prior Level of Function      Flowsheet Row Most Recent Value   Dominant Hand right   Ambulation assistive equipment   Transferring assistive equipment   Toileting independent   Bathing independent   Dressing independent   Eating independent   IADLs independent   Driving/Transportation    Communication understands/communicates without difficulty   Prior Level of Function Comment Reports being IND with all mobility and ADLs however recently with knee pain has been using RW   Assistive Device Currently Used at Home walker, front-wheeled        Prior Living Environment      Flowsheet Row Most Recent Value   People in Home alone   Current Living Arrangements apartment   Living Environment Comment Alone in apt with 0STE. Tub shower with SC and GBs. Son is able to help a few hours per day     Occupational Profile      Flowsheet Row Most Recent Value   Successful Occupations retired   Occupational History/Life Experiences IND / mod I PTA   Environmental Supports and Barriers lives alone, reports has a supportive neighbor       VITALS AND PAIN     OT Vitals      Date/Time Pulse HR Source SpO2 Pt  Activity O2 Therapy BP BP Location BP Method Pt Position [facility]   08/03/25 0950 100 Monitor 96 % At rest None (Room air) 105/53 Right upper arm Automatic Sitting RLP          OT Pain      Date/Time Pain Type Side/Orientation Location Rating: Rest Rating: Activity Interventions [facility]   08/03/25 0950 Pain Assessment knee left 5 10 care clustered RLP               Objective     OBJECTIVE     Start time:  0947  End time:  1010  Session Length: 23 min  Mode of Treatment: co-treatment  Reason for co-treatment: limited activity tolerance, OT focus on ADLs    General Observations  Patient received upright (on BSC). She was agreeable to therapy, no issues or concerns identified by nurse prior to session. received sitting up on BSC    Precautions: fall, weight bearing  Extremity Weight-Bearing Status: left lower extremity  Left LE Weight-Bearing Status: weight-bearing as tolerated (WBAT)    Limitations/Impairments: safety/cognitive   Services  Do You Speak a Language Other Than English at Home?: no  Is an  Needed/Used?: No      OT Eval and Treat - 08/03/25 0947          Bed Mobility    Comment OOB throughout session        Mobility Belt    Mobility Belt Used During Session yes        Sit/Stand Transfer    Surface --   BSC    [place] minimum assist (75% or more patient effort)     Safety/Cues increased time to complete;verbal cues;hand placement;proper use of assistive device;sequencing;technique     Assistive Device walker, front-wheeled        Surface-to-Surface Transfers    Transfer Location --   BSC > chair    Transfer Technique stand step     [place] minimum assist (75% or more patient effort)     Safety/Cues increased time to complete;verbal cues;hand placement;proper use of assistive device;sequencing;technique     Assistive Device walker, front-wheeled        Functional Mobility    Distance in room/bathroom     Functional Mobility [place] minimum assist (75% or more patient effort)      Safety/Cues verbal cues;energy conservation/pacing;increased time to complete;hand placement;technique;sequencing     Assistive Device walker, front-wheeled        Lower Body Dressing    Tasks don;socks     [place] dependent (less than 25% patient effort)     Safety/Cues increased time to complete     Position supported sitting     Adaptive Equipment none        Toileting    Tasks adjust/manage clothing;bridging;perform bladder hygiene     [place] minimum assist (75% or more patient effort);moderate assist (50-74% patient effort)     Safety/Cues increased time to complete;verbal cues;energy conservation;compensatory techniques;sequencing;termination     Position supported sitting     Setup Assistance obtain supplies     Adaptive Equipment commode, 3-in-1     Comment BSC + RW        Balance    Static Sitting Balance WFL;supported     Dynamic Sitting Balance mild impairment;supported     Sit to Stand Dynamic Balance mild impairment;standing;supported     Static Standing Balance mild impairment;standing;supported     Dynamic Standing Balance mild impairment;standing;supported     Balance Interventions occupation based/functional task     Comment, Balance w/ RW                                                 Session Outcome  Patient reclined, in chair at end of session, chair alarm on, call light in reach, personal items in reach, all needs met. Nursing notified about patient's performance, patient's position, and patient's response to therapy/activity.    AM-PAC™ - ADL (Current Function)     Putting on/taking off regular lower body clothing 1 - Total   Bathing 2 - A Lot   Toileting 2 - A Lot   Putting on/taking off regular upper body clothing 3 - A Little   Help for taking care of personal grooming 3 - A Little   Eating meals 4 - None   AM-PAC™ ADL Score 15          ASSESSMENT AND PLAN     Progress Summary  OT tx complete. AMPAC 15. Patient presents s/p L TKA . Pt up on BSC at start of session. Completed  pericare and hygiene Min-Mod A while seated BSC. Pt requires Dep for LB dressing. Functional mobility completed w/ RW and Min A. Pt is limited at this time by post op pain and deficits in balance, strength and endurance. Pt would benefit from cont OT to maximize IND c ADLs and safe functional mobility. Rec d/c to SNF when medically stable.    Patient/Family Therapy Goal Statement: less pain    OT Plan      Flowsheet Row Most Recent Value   Rehab Potential good, to achieve stated therapy goals at 08/03/2025 0947   Therapy Frequency 5 times/wk at 08/03/2025 0947   Planned Therapy Interventions activity tolerance training, adaptive equipment training, functional balance retraining, occupation/activity based interventions, ROM/therapeutic exercise, strengthening exercise, transfer/mobility retraining, BADL retraining at 08/03/2025 0947       OT Discharge Recommendations      Flowsheet Row Most Recent Value   OT Recommended Discharge Disposition skilled nursing facility at 08/03/2025 0947   Anticipated Equipment Needs if Discharged Home (OT) commode, 3-in-1, walker, front-wheeled, dressing equipment at 08/03/2025 0947            OT Goals      Flowsheet Row Most Recent Value   Bed Mobility Goal 1    Activity/Assistive Device bed mobility activities, all at 08/02/2025 0910   [place] modified independence at 08/02/2025 0910   Time Frame by discharge at 08/02/2025 0910   Progress/Outcome goal ongoing at 08/02/2025 0910   Transfer Goal 1    Activity/Assistive Device toilet at 08/02/2025 0910   [place] modified independence at 08/02/2025 0910   Time Frame by discharge at 08/02/2025 0910   Progress/Outcome goal ongoing at 08/02/2025 0910   Dressing Goal 1    Activity/Adaptive Equipment dressing skills, all at 08/02/2025 0910   [place] modified independence at 08/02/2025 0910   Time Frame by discharge at 08/02/2025 0910   Progress/Outcome goal ongoing at 08/02/2025 0910   Toileting Goal 1    Activity/Assistive  Device toileting skills, all at 08/02/2025 0910   [place] modified independence at 08/02/2025 0910   Time Frame by discharge at 08/02/2025 0910   Progress/Outcome goal ongoing at 08/02/2025 0910

## 2025-08-03 NOTE — PLAN OF CARE
Problem: Knee Arthroplasty  Goal: Optimal Coping  Outcome: Progressing  Goal: Absence of Bleeding  Outcome: Progressing  Goal: Effective Bowel Elimination  Outcome: Progressing  Goal: Fluid and Electrolyte Balance  Outcome: Progressing  Goal: Optimal Functional Ability  Outcome: Progressing  Goal: Absence of Infection Signs and Symptoms  Outcome: Progressing  Goal: Intact Neurovascular Status  Outcome: Progressing  Goal: Anesthesia/Sedation Recovery  Outcome: Progressing  Goal: Optimal Pain Control and Function  Outcome: Progressing  Goal: Nausea and Vomiting Relief  Outcome: Progressing  Goal: Effective Urinary Elimination  Outcome: Progressing  Goal: Effective Oxygenation and Ventilation  Outcome: Progressing     Problem: Adult Inpatient Plan of Care  Goal: Plan of Care Review  Outcome: Progressing  Flowsheets (Taken 8/3/2025 6169)  Progress: no change  Outcome Evaluation: Pt oriented x4. LLE neurovasc stable. Pain managed with current regimen. Ax1 to BSC. Bed alarm on and call bell within reach.  Plan of Care Reviewed With: patient  Goal: Patient-Specific Goal (Individualized)  Outcome: Progressing  Goal: Absence of Hospital-Acquired Illness or Injury  Outcome: Progressing  Goal: Optimal Comfort and Wellbeing  Outcome: Progressing  Goal: Readiness for Transition of Care  Outcome: Progressing     Problem: Mobility Impairment  Goal: Optimal Mobility  Outcome: Progressing     Problem: Skin Injury Risk Increased  Goal: Skin Health and Integrity  Outcome: Progressing     Problem: Fall Injury Risk  Goal: Absence of Fall and Fall-Related Injury  Outcome: Progressing   Plan of Care Review  Plan of Care Reviewed With: patient  Progress: no change  Outcome Evaluation: Pt oriented x4. LLE neurovasc stable. Pain managed with current regimen. Ax1 to BSC. Bed alarm on and call bell within reach.

## 2025-08-03 NOTE — PROGRESS NOTES
Orthopedic Progress Note    Subjective     Patient seen and examined at bedside. Doing well this AM. Pain is well controlled. Denies any numbness or tingling. Rec for SNF after PT yesterday.     Objective       Vitals:    08/03/25 0349   BP:    Pulse: 88   Resp:    Temp:    SpO2:        General: VSS, NAD    Extremities:  LLE:  Incision C/D/I  Compartments soft and compressible  Motor intact FHL/EHL/TA/GSC/Q/HS  SILT DPN/SPN/T/S/S  2+ DP pulses  Foot WWP  BCR      Assessment     75 y.o. female s/p L TKA with Dr. Manzano, rec for SNF    Plan     -Ancef x24h  -DVT ppx: ASA BID  -WBAT BLE  -PT/OT   -Pain control  -Follow-up   -appreciate sw, care management recs   -Dispo: pending PT/OT/pain control      Alan Castillo, DO  Orthopedic Surgery, PGY-4  Pager: [phone]

## 2025-08-03 NOTE — PROGRESS NOTES
Hospital Medicine Service  Daily Progress Note       SUBJECTIVE     Patient seen earlier today.  Sitting up in the chair.  Denied any lightheadedness or dizziness..  Reports a lot of pain still in the left knee.      OBJECTIVE        Vital Signs  Temp:  [36.4 °C (97.5 °F)-36.9 °C (98.5 °F)] 36.4 °C (97.5 °F)  Heart Rate:  [] 89  Resp:  [18] 18  BP: (100-124)/(53-61) 108/53     SpO2 Readings from Last 3 Encounters:   08/03/25 95%   07/29/25 97%       I/O last 3 completed shifts:  In: 2129.3 [P.O.:1080; I.V.:949.3; IV Piggyback:100]  Out: 2750 [Urine:2750]    PHYSICAL EXAMINATION        GEN:; not in acute distress  HEENT: normocephalic; atraumatic    CARDIO: regular rate and rhythm;   RESP: decreased at bases  ABD: soft,  non-tender, normal bowel sounds  EXT: Left knee dressing present  NEURO: Awake alert oriented x 3, nonfocal       LABS / IMAGING / TELE        Labs  Lab Results   Component Value Date    WBC 8.84 08/03/2025    HGB 8.8 (L) 08/03/2025    HCT 27.0 (L) 08/03/2025    MCV 96.4 08/03/2025     08/03/2025     Lab Results   Component Value Date    GLUCOSE 133 (H) 08/03/2025    CALCIUM 7.9 (L) 08/03/2025     08/03/2025    K 4.0 08/03/2025    CO2 24 08/03/2025     (H) 08/03/2025    BUN 12 08/03/2025    CREATININE 0.8 08/03/2025     Lab Results   Component Value Date    INR 1.1 07/29/2025       Imaging  X-RAY KNEE LEFT 1 OR 2 VIEWS  Result Date: 8/1/2025  IMPRESSION: Status post total knee arthroplasty without immediate hardware complication.         ASSESSMENT AND PLAN      # s/p  left knee arthroplasty  placement as per orthopedics.  Tylenol around-the-clock, oxycodone and Dilaudid as needed    #Near syncope  RRT called last night as patient had near syncopal episode.  Patient was unable to urinate and possibly caused discomfort causing the near syncopal episode.  Patient had 700 on the bladder scan and improved after straight cath.    # Urinary retention  Continue bladder scan and  straight catheter more than 500.  Patient has been urinating without problems since 8/2/2025 morning    # Chronic kidney disease stage 3a  Creatinine stable     #  history of AML  Diagnosed in 2021, she has allogenic stem cell transplant, now in remission  She is on Ivosidenib (TIBSOVO 250 mg a day) daily (nonformulary)  She will bring the medication from home, so resume on the day post surgically  Monitor cbc look for wbc count, rbc transfuse to keep>7 and platelets  Her oncologist at [place] kidney transplant okay with aspirin or DVT prophylaxis if needed   Follow up with oncology 3 months     #  h/o vasovagal syncope  Patient has multiple episodes of vasovagal syncope in the past  Avoid pain, stress as much as possible  Fall precautions  Ambulate to bathroom and monitor closely, do not leave her alone while using rest room     #  hypothyroidism   - continue home: levothyroxine tablet po daily     #  GERD  - continue home: famotidine 20 mg po BID     #  hyperlipidemia  continue Crestor    # Anemia: normocytic  Acute blood loss anemia expected postop. Monitor     VTE Assessment: Aspirin twice daily  Code Status: Full Code  Estimated discharge date: 8/4/2025     Rey Almonte MD  8/3/2025  3:18 PM

## 2025-08-04 LAB
ANION GAP SERPL CALC-SCNC: 4 MEQ/L (ref 3–15)
BUN SERPL-MCNC: 10 MG/DL (ref 7–25)
CALCIUM SERPL-MCNC: 8.3 MG/DL (ref 8.6–10.3)
CHLORIDE SERPL-SCNC: 107 MEQ/L (ref 98–107)
CO2 SERPL-SCNC: 26 MEQ/L (ref 21–31)
CREAT SERPL-MCNC: 0.8 MG/DL (ref 0.6–1.2)
EGFRCR SERPLBLD CKD-EPI 2021: >60 ML/MIN/1.73M*2
ERYTHROCYTE [DISTWIDTH] IN BLOOD BY AUTOMATED COUNT: 14.4 % (ref 11.7–14.4)
GLUCOSE SERPL-MCNC: 120 MG/DL (ref 70–99)
HCT VFR BLD AUTO: 29.5 % (ref 35–45)
HGB BLD-MCNC: 9.5 G/DL (ref 11.8–15.7)
MAGNESIUM SERPL-MCNC: 1.9 MG/DL (ref 1.8–2.5)
MCH RBC QN AUTO: 31.5 PG (ref 28–33.2)
MCHC RBC AUTO-ENTMCNC: 32.2 G/DL (ref 32.2–35.5)
MCV RBC AUTO: 97.7 FL (ref 83–98)
PLATELET # BLD AUTO: 171 K/UL (ref 150–369)
PMV BLD AUTO: 8.9 FL (ref 9.4–12.3)
POTASSIUM SERPL-SCNC: 4 MEQ/L (ref 3.5–5.1)
RBC # BLD AUTO: 3.02 M/UL (ref 3.93–5.22)
SODIUM SERPL-SCNC: 137 MEQ/L (ref 136–145)
WBC # BLD AUTO: 8.21 K/UL (ref 3.8–10.5)

## 2025-08-04 PROCEDURE — 20600000 HC ROOM AND CARE INTERMEDIATE/TELEMETRY

## 2025-08-04 PROCEDURE — 83735 ASSAY OF MAGNESIUM: CPT | Performed by: HOSPITALIST

## 2025-08-04 PROCEDURE — 36415 COLL VENOUS BLD VENIPUNCTURE: CPT | Performed by: HOSPITALIST

## 2025-08-04 PROCEDURE — 80048 BASIC METABOLIC PNL TOTAL CA: CPT | Performed by: HOSPITALIST

## 2025-08-04 PROCEDURE — 97530 THERAPEUTIC ACTIVITIES: CPT | Mod: GP,CQ,U8

## 2025-08-04 PROCEDURE — 97535 SELF CARE MNGMENT TRAINING: CPT | Mod: GO,U8

## 2025-08-04 PROCEDURE — 85027 COMPLETE CBC AUTOMATED: CPT | Performed by: HOSPITALIST

## 2025-08-04 PROCEDURE — 25800000 HC PHARMACY IV SOLUTIONS

## 2025-08-04 PROCEDURE — 63700000 HC SELF-ADMINISTRABLE DRUG: Performed by: NURSE PRACTITIONER

## 2025-08-04 PROCEDURE — 63700000 HC SELF-ADMINISTRABLE DRUG: Performed by: ORTHOPAEDIC SURGERY

## 2025-08-04 PROCEDURE — 99232 SBSQ HOSP IP/OBS MODERATE 35: CPT | Performed by: HOSPITALIST

## 2025-08-04 RX ORDER — AMOXICILLIN 250 MG
1 CAPSULE ORAL 2 TIMES DAILY
COMMUNITY
Start: 2025-08-04 | End: 2025-09-03

## 2025-08-04 RX ORDER — POLYETHYLENE GLYCOL 3350 17 G/17G
17 POWDER, FOR SOLUTION ORAL DAILY
COMMUNITY
Start: 2025-08-04 | End: 2025-08-07

## 2025-08-04 RX ORDER — OXYCODONE HYDROCHLORIDE 10 MG/1
5-10 TABLET ORAL EVERY 4 HOURS PRN
Qty: 10 TABLET | Refills: 0 | Status: SHIPPED | OUTPATIENT
Start: 2025-08-04 | End: 2025-08-09

## 2025-08-04 RX ORDER — ACETAMINOPHEN 325 MG/1
650 TABLET ORAL
COMMUNITY
Start: 2025-08-04 | End: 2025-09-03

## 2025-08-04 RX ORDER — ASPIRIN 81 MG/1
81 TABLET ORAL 2 TIMES DAILY
COMMUNITY
Start: 2025-08-04 | End: 2025-09-03

## 2025-08-04 RX ADMIN — SODIUM CHLORIDE 500 ML: 9 INJECTION, SOLUTION INTRAVENOUS at 04:00

## 2025-08-04 RX ADMIN — DOCUSATE SODIUM 100 MG: 100 CAPSULE, LIQUID FILLED ORAL at 08:52

## 2025-08-04 RX ADMIN — OXYCODONE HYDROCHLORIDE 10 MG: 5 TABLET ORAL at 11:56

## 2025-08-04 RX ADMIN — ACETAMINOPHEN 650 MG: 325 TABLET ORAL at 17:50

## 2025-08-04 RX ADMIN — ASPIRIN 81 MG: 81 TABLET, COATED ORAL at 21:34

## 2025-08-04 RX ADMIN — LEVOTHYROXINE SODIUM 125 MCG: 0.12 TABLET ORAL at 06:50

## 2025-08-04 RX ADMIN — DOCUSATE SODIUM 100 MG: 100 CAPSULE, LIQUID FILLED ORAL at 21:34

## 2025-08-04 RX ADMIN — FAMOTIDINE 20 MG: 20 TABLET, FILM COATED ORAL at 08:52

## 2025-08-04 RX ADMIN — FAMOTIDINE 20 MG: 20 TABLET, FILM COATED ORAL at 21:34

## 2025-08-04 RX ADMIN — ASPIRIN 81 MG: 81 TABLET, COATED ORAL at 08:53

## 2025-08-04 RX ADMIN — ROSUVASTATIN 10 MG: 10 TABLET, FILM COATED ORAL at 21:34

## 2025-08-04 RX ADMIN — VALACYCLOVIR HYDROCHLORIDE 500 MG: 1 TABLET, FILM COATED ORAL at 08:52

## 2025-08-04 RX ADMIN — DOCUSATE SODIUM AND SENNOSIDES 1 TABLET: 8.6; 5 TABLET, FILM COATED ORAL at 21:34

## 2025-08-04 RX ADMIN — VALACYCLOVIR HYDROCHLORIDE 500 MG: 1 TABLET, FILM COATED ORAL at 21:34

## 2025-08-04 RX ADMIN — OXYCODONE HYDROCHLORIDE 10 MG: 5 TABLET ORAL at 07:14

## 2025-08-04 RX ADMIN — OXYCODONE HYDROCHLORIDE 10 MG: 5 TABLET ORAL at 03:10

## 2025-08-04 RX ADMIN — OXYCODONE HYDROCHLORIDE 10 MG: 5 TABLET ORAL at 21:54

## 2025-08-04 RX ADMIN — DOCUSATE SODIUM AND SENNOSIDES 1 TABLET: 8.6; 5 TABLET, FILM COATED ORAL at 08:53

## 2025-08-04 RX ADMIN — POLYETHYLENE GLYCOL 3350 17 G: 17 POWDER, FOR SOLUTION ORAL at 08:53

## 2025-08-04 RX ADMIN — OXYCODONE HYDROCHLORIDE 10 MG: 5 TABLET ORAL at 17:50

## 2025-08-04 RX ADMIN — ACETAMINOPHEN 650 MG: 325 TABLET ORAL at 06:50

## 2025-08-04 RX ADMIN — ACETAMINOPHEN 650 MG: 325 TABLET ORAL at 11:57

## 2025-08-04 ASSESSMENT — COGNITIVE AND FUNCTIONAL STATUS - GENERAL
WALKING IN HOSPITAL ROOM: 3 - A LITTLE
AFFECT: FLAT/BLUNTED AFFECT;AGITATED
MOVING TO AND FROM BED TO CHAIR: 3 - A LITTLE
STANDING UP FROM CHAIR USING ARMS: 2 - A LOT
EATING MEALS: 4 - NONE
HELP NEEDED FOR BATHING: 2 - A LOT
MOVING TO AND FROM BED TO CHAIR: 2 - A LOT
DRESSING REGULAR LOWER BODY CLOTHING: 1 - TOTAL
CLIMB 3 TO 5 STEPS WITH RAILING: 1 - TOTAL
WALKING IN HOSPITAL ROOM: 2 - A LOT
STANDING UP FROM CHAIR USING ARMS: 3 - A LITTLE
AFFECT: WFL
CLIMB 3 TO 5 STEPS WITH RAILING: 2 - A LOT
HELP NEEDED FOR PERSONAL GROOMING: 4 - NONE
MOVING TO AND FROM BED TO CHAIR: 3 - A LITTLE
CLIMB 3 TO 5 STEPS WITH RAILING: 1 - TOTAL
DRESSING REGULAR UPPER BODY CLOTHING: 3 - A LITTLE
STANDING UP FROM CHAIR USING ARMS: 3 - A LITTLE
TOILETING: 3 - A LITTLE
WALKING IN HOSPITAL ROOM: 3 - A LITTLE

## 2025-08-04 NOTE — PROGRESS NOTES
Hospital Medicine     Daily Progress Note       SUBJECTIVE   Complaining of pain in left knee  No bowel movement yet  Passing gas    Afebrile  On room air     OBJECTIVE   Vital signs in last 24 hours:  Temp:  [36.7 °C (98 °F)-37.4 °C (99.3 °F)] 36.7 °C (98 °F)  Heart Rate:  [] 92  Resp:  [16-18] 16  BP: (124-140)/(58-64) 124/60    Intake/Output Summary (Last 24 hours) at 8/4/2025 1822  Last data filed at 8/4/2025 1345  Gross per 24 hour   Intake 720 ml   Output 500 ml   Net 220 ml       Physical Exam  Vitals and nursing note reviewed.   Constitutional:       Appearance: Normal appearance. She is well-developed and normal weight.   Eyes:      General: No scleral icterus.  Neck:      Vascular: No JVD.   Cardiovascular:      Rate and Rhythm: Normal rate and regular rhythm.      Heart sounds: Normal heart sounds.   Pulmonary:      Effort: Pulmonary effort is normal.      Breath sounds: Normal breath sounds.   Abdominal:      General: Bowel sounds are normal.      Palpations: Abdomen is soft. There is no mass.      Tenderness: There is no abdominal tenderness.   Musculoskeletal:         General: No swelling.      Cervical back: Neck supple.      Left lower leg: Edema present.   Neurological:      Mental Status: She is alert and oriented to person, place, and time.   Psychiatric:         Behavior: Behavior normal.             LABS, IMAGING, TELEMETRY   Labs  I have reviewed the patient's labs.  Creatinine 0.8 magnesium 1.9 hemoglobin 9.5    Imaging      ECG/Telemetry  Reviewed by me:    ASSESSMENT AND PLAN   # S/p left knee arthroplasty  Management as per orthopedics.  On aspirin twice a day for DVT prophylaxis  On Tylenol around-the-clock, oxycodone as needed     # Near syncope  History of vasovagal syncope in the past  RRT called early morning of August 1 as patient had near syncopal episode. Patient was unable to urinate and possibly caused discomfort causing the near syncopal episode. Patient had  700 on the bladder scan and improved after straight cath.     # Urinary retention  Continue bladder scan and straight catheter more than 500.     Patient has been urinating without problems since 8/2/2025 morning       # Anemia: normocytic  Acute blood loss anemia expected postop.   Stable     # Chronic kidney disease stage 3a  Creatinine stable     # History of AML  Diagnosed in 2021, she has allogenic stem cell transplant  She is on Ivosidenib (TIBSOVO 250 mg a day) daily   Her oncologist at [facility] kidney transplant okay with aspirin or DVT prophylaxis if needed     # Hypothyroidism  - continue home: levothyroxine tablet po daily     # GERD  - continue home: famotidine 20 mg po BID     # Hyperlipidemia  continue Crestor         VTE Prophylaxis:  Current anticoagulants:    None      Code Status: Full Code        Estimated Discharge Date: 8/4/2025            Floresita Lopez MD  8/4/2025

## 2025-08-04 NOTE — PROGRESS NOTES
Occupational Therapy -  Daily Treatment/Progress Note     Patient: Dorothy Hernandez  Location: [facility] 4A 4026  MRN: [id number]  Today's date: 8/4/2025    HISTORY OF PRESENT ILLNESS     Dorothy is a 75 y.o. female admitted on 8/1/2025 with Osteoarthritis of left knee, unspecified osteoarthritis type [M17.12]  Localized osteoarthritis of left knee [M17.12]. Principal problem is No Principal Problem: There is no principal problem currently on the Problem List. Please update the Problem List and refresh..    Past Medical History  Dorothy has a past medical history of AML (acute myeloblastic leukemia) (CMS/HCC), Hypothyroidism, MVP (mitral valve prolapse), and Seasonal allergies.    History of Present Illness   Patient is now s/p L TKA with Dr. Manzano on 8/1/25    PRIOR LEVEL OF FUNCTION AND LIVING ENVIRONMENT     Prior Level of Function      Flowsheet Row Most Recent Value   Dominant Hand right   Ambulation assistive equipment   Transferring assistive equipment   Toileting independent   Bathing independent   Dressing independent   Eating independent   IADLs independent   Driving/Transportation    Communication understands/communicates without difficulty   Prior Level of Function Comment Reports being IND with all mobility and ADLs however recently with knee pain has been using RW   Assistive Device Currently Used at Home walker, front-wheeled        Prior Living Environment      Flowsheet Row Most Recent Value   People in Home alone   Current Living Arrangements apartment   Living Environment Comment Alone in apt with 0STE. Tub shower with SC and GBs. Son is able to help a few hours per day     Occupational Profile      Flowsheet Row Most Recent Value   Successful Occupations retired   Occupational History/Life Experiences IND / mod I PTA   Environmental Supports and Barriers lives alone, reports has a supportive neighbor       VITALS AND PAIN   Unable to obtain vitals during session.           Objective     OBJECTIVE     Start time:  1436  End time:  1454  Session Length: 18 min       General Observations  Patient received upright, in chair. She was agreeable to therapy, no issues or concerns identified by nurse prior to session. answered call bell, pt requesting to use bathroom    Precautions: fall, weight bearing  Extremity Weight-Bearing Status: left lower extremity  Left LE Weight-Bearing Status: weight-bearing as tolerated (WBAT)    Limitations/Impairments: safety/cognitive      OT Eval and Treat - 08/04/25 1436          Cognition    Orientation Status oriented x 3     Affect/Mental Status flat/blunted affect;agitated     Follows Commands follows one-step commands;increased processing time needed;initiation impaired;physical/tactile prompts required;repetition of directions required;verbal cues/prompting required;75-90% accuracy     Cognitive Function executive function deficit;safety deficit     Executive Function Deficit information processing;problem-solving/reasoning;planning/decision-making;self-monitoring/self-correction;insight/awareness of deficits     Safety Deficit awareness of need for assistance;insight into deficits/self-awareness;moderate deficit;safety precautions awareness     Comment, Cognition flat affect, easily agitated with education and cues regarding safety and sequencing with mobility. decreased safety awareness and insihgt.        Bed Mobility    [place] not tested     Comment OOB throughout session        Mobility Belt    Mobility Belt Used During Session yes        Sit/Stand Transfer    Surface chair with arm rests     [place] minimum assist (75% or more patient effort);1 person assist     Safety/Cues increased time to complete;minimal;verbal cues;technique;sequencing     Assistive Device walker, front-wheeled        Surface-to-Surface Transfers    Transfer Location --   chair<>commode    Transfer Technique stand step     [place] minimum assist (75% or  more patient effort);1 person assist     Safety/Cues increased time to complete;minimal;verbal cues;hand placement;sequencing;technique;proper use of assistive device     Assistive Device walker, front-wheeled     Transfer Comments pt with minimal WB'ing on LLE, not receptive to cues and education on WB'ing status and sequencing with use of RW        Toilet Transfer    Transfer Technique sit/stand     [place] minimum assist (75% or more patient effort);1 person assist     Safety/Cues minimal;verbal cues;hand placement     Assistive Device walker, front-wheeled;commode, 3-in-1     Comment slow to rise        Grooming    Tasks washes, rinses and dries hands     [place] modified independence     Position supported sitting     Adaptive Equipment none        Toileting    Tasks perform bladder hygiene     [place] supervision     Safety/Cues minimal;verbal cues     Position supported sitting     Adaptive Equipment commode, 3-in-1     Comment cues for safety        Balance    Static Sitting Balance WFL;sitting in chair     Dynamic Sitting Balance mild impairment;sitting in chair     Sit to Stand Dynamic Balance mild impairment;supported     Static Standing Balance mild impairment;supported     Dynamic Standing Balance mild impairment;supported     Comment, Balance with RW        Impairments/Safety Issues    Impairments Affecting Function balance;motor planning;strength;pain;endurance/activity tolerance     Functional Endurance fair-, limited by pain     Safety Issues Affecting Function judgment;sequencing abilities;problem-solving;insight into deficits/self-awareness;safety precaution awareness                     Functional Reach Test  Trial One: Functional Reach Test (in):  (unable to test this session)                      Session Outcome  Patient reclined, in chair at end of session, chair alarm on, all needs met, call light in reach, personal items in reach. Nursing notified about patient's performance,  patient's position, and patient's response to therapy/activity.    AM-PAC™ - ADL (Current Function)     Putting on/taking off regular lower body clothing 1 - Total   Bathing 2 - A Lot   Toileting 3 - A Little   Putting on/taking off regular upper body clothing 3 - A Little   Help for taking care of personal grooming 4 - None   Eating meals 4 - None   AM-PAC™ ADL Score 17          ASSESSMENT AND PLAN     Progress Summary   OT treat complete, ADL AM-PAC score of 17. Patient demonstrates Min A sit<>stand, SPT with RW, and commode transfer, supervision toilet hygiene, mod I grooming while seated in chair. Patient will continue to benefit from skilled OT services to maximize independence with self care and functional mobility. Recommend SNF once medically stable.      Patient/Family Therapy Goal Statement: less pain    OT Plan      Flowsheet Row Most Recent Value   Rehab Potential good, to achieve stated therapy goals at 08/03/2025 0947   Therapy Frequency 5 times/wk at 08/03/2025 0947   Planned Therapy Interventions activity tolerance training, adaptive equipment training, functional balance retraining, occupation/activity based interventions, ROM/therapeutic exercise, strengthening exercise, transfer/mobility retraining, BADL retraining at 08/03/2025 0947       OT Discharge Recommendations      Flowsheet Row Most Recent Value   OT Recommended Discharge Disposition skilled nursing facility at 08/03/2025 0947   Anticipated Equipment Needs if Discharged Home (OT) commode, 3-in-1, walker, front-wheeled, dressing equipment at 08/03/2025 0947            OT Goals      Flowsheet Row Most Recent Value   Bed Mobility Goal 1    Activity/Assistive Device bed mobility activities, all at 08/02/2025 0910   [place] modified independence at 08/02/2025 0910   Time Frame by discharge at 08/02/2025 0910   Progress/Outcome goal ongoing at 08/02/2025 0910   Transfer Goal 1    Activity/Assistive Device toilet at 08/02/2025 0910    [place] modified independence at 08/02/2025 0910   Time Frame by discharge at 08/02/2025 0910   Progress/Outcome goal ongoing at 08/02/2025 0910   Dressing Goal 1    Activity/Adaptive Equipment dressing skills, all at 08/02/2025 0910   [place] modified independence at 08/02/2025 0910   Time Frame by discharge at 08/02/2025 0910   Progress/Outcome goal ongoing at 08/02/2025 0910   Toileting Goal 1    Activity/Assistive Device toileting skills, all at 08/02/2025 0910   [place] modified independence at 08/02/2025 0910   Time Frame by discharge at 08/02/2025 0910   Progress/Outcome goal ongoing at 08/02/2025 0910

## 2025-08-04 NOTE — PROGRESS NOTES
Orthopedic Progress Note    Subjective     Patient seen and examined at bedside. Doing well this AM. Pain is well controlled. Denies any numbness or tingling.  Walked 5 feet with physical therapy on 8/3, being rec for SNF facility.  Postop hemoglobin is 9.5.    Objective       Vitals:    08/04/25 0300   BP: (!) 128/58   Pulse: 96   Resp: 16   Temp: 37.4 °C (99.3 °F)   SpO2: 94%       General: VSS, NAD    Extremities:  LLE:  Incision C/D/I  Compartments soft and compressible  Motor intact FHL/EHL/TA/GSC/Q/HS  SILT DPN/SPN/T/S/S  2+ DP pulses  Foot WWP  BCR      Assessment     75 y.o. female s/p L TKA with Dr. Manzano, rec for SNF    Plan     -Ancef x24h  -DVT ppx: ASA BID  -WBAT BLE  -PT/OT   -Pain control  -Follow-up   -appreciate sw, care management recs   -Dispo: pending PT/OT/pain control      Sarabjit Domínguez DO  Orthopedic Surgery, PGY-2  Pager: [phone]

## 2025-08-04 NOTE — PROGRESS NOTES
Physical Therapy -  Daily Treatment/Progress Note     Patient: Dorothy Hernandez  Location: [address] 4026  MRN: [id number]  Today's date: 8/4/2025    HISTORY OF PRESENT ILLNESS     Dorothy is a 75 y.o. female admitted on 8/1/2025 with Osteoarthritis of left knee, unspecified osteoarthritis type [M17.12]  Localized osteoarthritis of left knee [M17.12]. Principal problem is No Principal Problem: There is no principal problem currently on the Problem List. Please update the Problem List and refresh..    Past Medical History  Dorothy has a past medical history of AML (acute myeloblastic leukemia) (CMS/HCC), Hypothyroidism, MVP (mitral valve prolapse), and Seasonal allergies.    History of Present Illness   Patient is now s/p L TKA with Dr. Manzano on 8/1/25    PRIOR LEVEL OF FUNCTION AND LIVING ENVIRONMENT     Prior Level of Function      Flowsheet Row Most Recent Value   Dominant Hand right   Ambulation assistive equipment   Transferring assistive equipment   Toileting independent   Bathing independent   Dressing independent   Eating independent   IADLs independent   Driving/Transportation    Communication understands/communicates without difficulty   Prior Level of Function Comment Reports being IND with all mobility and ADLs however recently with knee pain has been using RW   Assistive Device Currently Used at Home walker, front-wheeled        Prior Living Environment      Flowsheet Row Most Recent Value   People in Home alone   Current Living Arrangements apartment   Living Environment Comment Alone in apt with 0STE. Tub shower with SC and GBs. Son is able to help a few hours per day       VITALS AND PAIN     PT Vitals      Date/Time Pulse SpO2 O2 Therapy BP BP Location BP Method Pt Position Who   08/04/25 1101 96 95 % None (Room air) 138/64 119/43 Left upper arm Automatic Sitting AGA          PT Pain      Date/Time Pain Type Side/Orientation Location Rating: Rest Rating: Activity Interventions Who    08/04/25 1101 Pain Assessment left knee 4 8 position adjusted Ice reapplied AGA               Objective     OBJECTIVE     Start time:  1101  End time:  1131  Session Length: 30 min       General Observations  Patient received chair position. She was agreeable to therapy, no issues or concerns identified by nurse prior to session. Awake in bed attempting to Range LE on own.    Precautions: fall, weight bearing  Extremity Weight-Bearing Status: left lower extremity  Left LE Weight-Bearing Status: weight-bearing as tolerated (WBAT)    Limitations/Impairments: safety/cognitive   Services  Do You Speak a Language Other Than English at Home?: no      PT Eval and Treat - 08/04/25 1101          Cognition    Orientation Status oriented x 3     Affect/Mental Status WFL     Follows Commands follows one-step commands;over 90% accuracy;increased processing time needed;initiation impaired;physical/tactile prompts required;repetition of directions required;verbal cues/prompting required     Cognitive Function executive function deficit;safety deficit     Attention Deficit focused/sustained attention;arousal/alertness;minimal deficit     Executive Function Deficit information processing;problem-solving/reasoning;planning/decision-making;self-monitoring/self-correction        Lower Extremity Range of Motion    Knee, Left (ROM) AROM -24'-76' after seld Ranges.        Bed Mobility    [place] minimum assist (75% or more patient effort)     Safety/Cues increased time to complete     Comment Assistance to bring LE off bed  able tosit and move LE out to edge of bed.        Mobility Belt    Mobility Belt Used During Session yes        Sit/Stand Transfer    [place] minimum assist (75% or more patient effort)     Safety/Cues increased time to complete     Assistive Device walker, front-wheeled     Transfer Comments STS Two times this session second time improving balance to RW.        Gait Training    [place],  Gait minimum assist (75% or more patient effort)     Safety/Cues increased time to complete     Assistive Device walker, front-wheeled     Distance in Feet 9 feet     Comment Pt with step to gait c/o pain limiting mobility.  Sitting to recover, declined second ambulation trial due to pain, nausea.        Balance    Static Sitting Balance WFL     Dynamic Sitting Balance mild impairment;supported     Sit to Stand Dynamic Balance mild impairment;supported     Static Standing Balance mild impairment;supported     Dynamic Standing Balance mild impairment;supported     Comment, Balance RW        Lower Extremity (Therapeutic Exercise)    Exercise Position/Type AROM (active range of motion);AAROM (active assistive range of motion)     General Exercise ankle pumps;quad sets;gluteal sets;heel slides;LAQ (long arc quad);SAQ (short arc quad);hip aDduction;hip aBduction     Reps and Sets x6     Comment Pt with limited Quad setting but increasing ROM with AROM        Impairments/Safety Issues    Impairments Affecting Function balance;motor planning;strength;pain;endurance/activity tolerance     Functional Endurance FAir-     Safety Issues Affecting Function judgment;sequencing abilities;problem-solving                      10 Meter Walk Test (Self-Selected Velocity)  Trial One: Ten Meter Walk Test (sec): 0 seconds (pain limiting)                       Education Documentation  Post op Program Education, taught by Osvaldo Roland PTA at 8/4/2025 11:44 AM.  Learner: Patient  Readiness: Acceptance  Method: Explanation, Demonstration  Response: Verbalizes Understanding, Needs Reinforcement  Comment: Step to gait        Session Outcome  Patient chair position, reclined at end of session, call light in reach, bed alarm on, chair alarm on, personal items in reach, returned to room by therapy aide. Nursing notified about patient's performance, patient's response to therapy/activity, and patient's position.    AM-PAC™ - Mobility (Current  Function)     Turning form your back to your side while in flat bed without using bedrails 3 - A Little   Moving from lying on your back to sitting on the side of a flat bed without using bedrails 3 - A Little   Moving to and from a bed to a chair 3 - A Little   Standing up from a chair using your arms 3 - A Little   To walk in a hospital room 3 - A Little   Climbing 3-5 steps with a railing 1 - Total   AM-PAC™ Mobility Score 16          ASSESSMENT AND PLAN     Progress Summary  AMPAC16 Pt  min A bed moblity. Transfers min A to RW slow to stand. GAit min A limited gait nausea and slight nausea notified RN of drop in BP. Pt in chair reclined. Pt will benefit from cont. PT.    Patient/Family Therapy Goals Statement: To go home    PT Plan      Flowsheet Row Most Recent Value   Rehab Potential good, to achieve stated therapy goals at 08/04/2025 1101   Therapy Frequency 5 times/wk at 08/04/2025 1101   Planned Therapy Interventions balance training, bed mobility training, gait training, home exercise program, patient/family education, strengthening, ROM (range of motion), transfer training at 08/04/2025 1101       PT Discharge Recommendations      Flowsheet Row Most Recent Value   PT Recommended Discharge Disposition skilled nursing facility at 08/04/2025 1101   Anticipated Equipment Needs if Discharged Home (PT) none at 08/04/2025 1101            PT Goals      Flowsheet Row Most Recent Value   Bed Mobility Goal 1    Activity/Assistive Device bed mobility activities, all at 08/01/2025 1746   [place] modified independence at 08/01/2025 1746   Time Frame by discharge at 08/01/2025 1746   Progress/Outcome goal ongoing at 08/01/2025 1746   Transfer Goal 1    Activity/Assistive Device all transfers, walker, front-wheeled at 08/01/2025 1746   [place] modified independence at 08/01/2025 1746   Time Frame by discharge at 08/01/2025 1746   Progress/Outcome goal ongoing at 08/01/2025 1746   Gait Training Goal 1     Activity/Assistive Device gait (walking locomotion), walker, front-wheeled at 08/01/2025 1746   [place] modified independence at 08/01/2025 1746   Distance 75 at 08/01/2025 1746   Time Frame by discharge at 08/01/2025 1746   Progress/Outcome goal ongoing at 08/01/2025 1741

## 2025-08-04 NOTE — PLAN OF CARE
Care Coordination Discharge Plan Note     Discharge Needs Assessment  Concerns to be Addressed: care coordination/care conferences, discharge planning  Current Discharge Risk: lives alone    Anticipated Discharge Plan  Anticipated Discharge Disposition: skilled nursing facility  Type of Skilled Nursing Care Services: OT, PT, nursing      Patient Choice  Offered/Gave Vendor List: yes  Patient and/or patient guardian/advocate was made aware of their right to choose a provider. A list of eligible providers was presented and reviewed with the patient and/or patient guardian/advocate in written and/or verbal form. The list delineates providers in the patient’s desired geographic area who are participating in the Medicare program and/or providers contracted with the patient’s primary insurance. The Medicare list and quality ratings were obtained from the Medicare.gov [medicare.gov] website.    ---------------------------------------------------------------------------------------------------------------------    Interdisciplinary Discharge Plan Review:  Participants:social work/services, nursing, advanced practice provider, physician, patient    Concerns Comments: Per Ortho CRNP pt stable for d/c. EILEEN met with pt at bedside. Additional SNF choices received and EILEEN faxed referrals. Care Coordination continues to follow.    Addendum 1347: EILEEN met with pt at bedside to discuss SNF. Pt declining [facility]. Pt reports [facility] as first choice. EILEEN LVM and is awaiting response. Additional SNF referrals sent.    Discharge Plan:   Disposition/Destination: Skilled Nursing Facility - Other  / Skilled Nursing Facility  Discharge Facility:   Destination - Admitted Since 8/1/2025    No services have been selected for the patient.       Community Resources:      Discharge Transportation:  Is Out of Hospital DNR needed at Discharge: no  Does patient need discharge transport? Yes

## 2025-08-04 NOTE — PROGRESS NOTES
Orthopedic Progress Note    Subjective     Patient seen and examined at bedside. Doing well this AM. Pain is well controlled. Some anteromedial lower leg soreness, appropriate given recent procedure. Denies any numbness or tingling.  Walked 9 feet with physical therapy on 8/4 limited due to pain, being rec for SNF facility.     Objective       Vitals:    08/04/25 1508   BP: 124/60   Pulse: 92   Resp: 16   Temp: 36.7 °C (98 °F)   SpO2: 98%       General: VSS, NAD    Extremities:  LLE:  Incision C/D/I  Compartments soft and compressible  Motor intact FHL/EHL/TA/GSC/Q/HS  SILT DPN/SPN/T/S/S  2+ DP pulses  Foot WWP  BCR      Assessment     75 y.o. female s/p L TKA with Dr. Manzano, rec for SNF    Plan     -Ancef x24h  -DVT ppx: ASA BID  -WBAT BLE  -PT/OT   -Pain control  -Follow-up   -appreciate sw, care management recs   -Dispo: pending PT/OT/pain control      Sarabjit Domínguez DO  Orthopedic Surgery, PGY-2  Pager: [phone]

## 2025-08-04 NOTE — PLAN OF CARE
Problem: Knee Arthroplasty  Goal: Optimal Coping  Outcome: Progressing  Goal: Absence of Bleeding  Outcome: Progressing  Goal: Effective Bowel Elimination  Outcome: Progressing  Goal: Fluid and Electrolyte Balance  Outcome: Progressing  Goal: Optimal Functional Ability  Outcome: Progressing  Goal: Absence of Infection Signs and Symptoms  Outcome: Progressing  Goal: Intact Neurovascular Status  Outcome: Progressing  Goal: Anesthesia/Sedation Recovery  Outcome: Progressing  Goal: Optimal Pain Control and Function  Outcome: Progressing  Goal: Nausea and Vomiting Relief  Outcome: Progressing  Goal: Effective Urinary Elimination  Outcome: Progressing  Goal: Effective Oxygenation and Ventilation  Outcome: Progressing     Problem: Adult Inpatient Plan of Care  Goal: Plan of Care Review  Outcome: Progressing  Flowsheets (Taken 8/4/2025 6810)  Progress: no change  Outcome Evaluation:   Pt oriented x4. Pain managed with current regimen. LLE neurovasc stable. Voiding appropriately. Slightly tachy at rest 100-109 and one episode of 130s while up to BSC. [facility] notified   500 mL bolus ordered. Ax1 to BSC. Bed alarm on and call bell within reach.  Plan of Care Reviewed With: patient  Goal: Patient-Specific Goal (Individualized)  Outcome: Progressing  Goal: Absence of Hospital-Acquired Illness or Injury  Outcome: Progressing  Goal: Optimal Comfort and Wellbeing  Outcome: Progressing  Goal: Readiness for Transition of Care  Outcome: Progressing     Problem: Mobility Impairment  Goal: Optimal Mobility  Outcome: Progressing     Problem: Skin Injury Risk Increased  Goal: Skin Health and Integrity  Outcome: Progressing     Problem: Fall Injury Risk  Goal: Absence of Fall and Fall-Related Injury  Outcome: Progressing   Plan of Care Review  Plan of Care Reviewed With: patient  Progress: no change  Outcome Evaluation: Pt oriented x4. Pain managed with current regimen. LLE neurovasc stable. Voiding appropriately. Slightly tachy at rest  100-109 and one episode of 130s while up to BSC. [facility] notified; 500 mL bolus ordered. Ax1 to BSC. Bed alarm on and call bell within reach.

## 2025-08-04 NOTE — CONSULTS
I visited Dorothy during  rounding.   She was very friendly and deeply spiritual. She spoke about her personal devotions and mentioned that her jazmyne provides her with comfort and consolation. She requested prayer.   I provided empathic listening, a supportive presence, and prayer.   She expressed gratitude for the visit.     Ross Hernández, CPE Intern   [phone]   #[phone]

## 2025-08-05 PROCEDURE — 97530 THERAPEUTIC ACTIVITIES: CPT | Mod: GP,CQ,U8

## 2025-08-05 PROCEDURE — 63700000 HC SELF-ADMINISTRABLE DRUG: Performed by: ORTHOPAEDIC SURGERY

## 2025-08-05 PROCEDURE — 63700000 HC SELF-ADMINISTRABLE DRUG: Performed by: NURSE PRACTITIONER

## 2025-08-05 PROCEDURE — 20600000 HC ROOM AND CARE INTERMEDIATE/TELEMETRY

## 2025-08-05 RX ADMIN — DOCUSATE SODIUM 100 MG: 100 CAPSULE, LIQUID FILLED ORAL at 19:57

## 2025-08-05 RX ADMIN — ASPIRIN 81 MG: 81 TABLET, COATED ORAL at 19:57

## 2025-08-05 RX ADMIN — DOCUSATE SODIUM AND SENNOSIDES 1 TABLET: 8.6; 5 TABLET, FILM COATED ORAL at 08:22

## 2025-08-05 RX ADMIN — VALACYCLOVIR HYDROCHLORIDE 500 MG: 1 TABLET, FILM COATED ORAL at 08:23

## 2025-08-05 RX ADMIN — DOCUSATE SODIUM AND SENNOSIDES 1 TABLET: 8.6; 5 TABLET, FILM COATED ORAL at 19:57

## 2025-08-05 RX ADMIN — OXYCODONE HYDROCHLORIDE 10 MG: 5 TABLET ORAL at 03:45

## 2025-08-05 RX ADMIN — LEVOTHYROXINE SODIUM 125 MCG: 0.12 TABLET ORAL at 06:14

## 2025-08-05 RX ADMIN — OXYCODONE HYDROCHLORIDE 10 MG: 5 TABLET ORAL at 17:46

## 2025-08-05 RX ADMIN — ROSUVASTATIN 10 MG: 10 TABLET, FILM COATED ORAL at 20:00

## 2025-08-05 RX ADMIN — FAMOTIDINE 20 MG: 20 TABLET, FILM COATED ORAL at 19:57

## 2025-08-05 RX ADMIN — ACETAMINOPHEN 650 MG: 325 TABLET ORAL at 17:46

## 2025-08-05 RX ADMIN — ASPIRIN 81 MG: 81 TABLET, COATED ORAL at 08:22

## 2025-08-05 RX ADMIN — ACETAMINOPHEN 650 MG: 325 TABLET ORAL at 12:53

## 2025-08-05 RX ADMIN — FAMOTIDINE 20 MG: 20 TABLET, FILM COATED ORAL at 08:23

## 2025-08-05 RX ADMIN — OXYCODONE HYDROCHLORIDE 10 MG: 5 TABLET ORAL at 12:53

## 2025-08-05 RX ADMIN — POLYETHYLENE GLYCOL 3350 17 G: 17 POWDER, FOR SOLUTION ORAL at 08:24

## 2025-08-05 RX ADMIN — DOCUSATE SODIUM 100 MG: 100 CAPSULE, LIQUID FILLED ORAL at 08:22

## 2025-08-05 RX ADMIN — VALACYCLOVIR HYDROCHLORIDE 500 MG: 1 TABLET, FILM COATED ORAL at 19:58

## 2025-08-05 RX ADMIN — ACETAMINOPHEN 650 MG: 325 TABLET ORAL at 06:14

## 2025-08-05 RX ADMIN — OXYCODONE HYDROCHLORIDE 10 MG: 5 TABLET ORAL at 08:23

## 2025-08-05 RX ADMIN — OXYCODONE HYDROCHLORIDE 10 MG: 5 TABLET ORAL at 22:17

## 2025-08-05 ASSESSMENT — COGNITIVE AND FUNCTIONAL STATUS - GENERAL
AFFECT: FLAT/BLUNTED AFFECT;AGITATED
MOVING TO AND FROM BED TO CHAIR: 3 - A LITTLE
STANDING UP FROM CHAIR USING ARMS: 3 - A LITTLE
STANDING UP FROM CHAIR USING ARMS: 2 - A LOT
MOVING TO AND FROM BED TO CHAIR: 2 - A LOT
CLIMB 3 TO 5 STEPS WITH RAILING: 1 - TOTAL
WALKING IN HOSPITAL ROOM: 2 - A LOT
WALKING IN HOSPITAL ROOM: 3 - A LITTLE
CLIMB 3 TO 5 STEPS WITH RAILING: 1 - TOTAL

## 2025-08-05 NOTE — PLAN OF CARE
Care Coordination Discharge Plan Note     Discharge Needs Assessment  Concerns to be Addressed: care coordination/care conferences, discharge planning  Current Discharge Risk: lives alone    Anticipated Discharge Plan  Anticipated Discharge Disposition: skilled nursing facility    Type of Skilled Nursing Care Services: OT, PT, nursing      Patient Choice  Offered/Gave Vendor List: yes  Patient and/or patient guardian/advocate was made aware of their right to choose a provider. A list of eligible providers was presented and reviewed with the patient and/or patient guardian/advocate in written and/or verbal form. The list delineates providers in the patient’s desired geographic area who are participating in the Medicare program and/or providers contracted with the patient’s primary insurance. The Medicare list and quality ratings were obtained from the Medicare.gov [medicare.gov] website.    ---------------------------------------------------------------------------------------------------------------------    Interdisciplinary Discharge Plan Review:  Participants:social work/services, patient, nursing, advanced practice provider, physician    Concerns Comments: EILEEN LVM following up on SNF referrals. EILEEN met with pt at bedside re. d/c plan. Pt agreeable to additional SNF referrals. Pt accepted at [facility]. Pt agreeable. EILEEN spoke with [facility] rola SANCHEZ who reported she will obtain auth. EILEEN updated RN, MD and Ortho CARLA. Care Coordination continues to follow.    Addendum 1350: EILEEN spoke with [facility] Rola SANCHEZ Who stated that auth still pending. Pending Case# [id number].    Discharge Plan:   Disposition: Skilled Nursing Facility - Other   Destination: Skilled Nursing Facility  Destination - Admitted Since 8/1/2025       Service Provider Services Address Phone Fax Patient Preferred Last Updated    [facility] [address]  [place] [address] [phone] -- Latia Duenas MSW 08/05/2025 10:34 AM               Discharge Transportation:  Is Out of Hospital DNR needed at Discharge: no  Does patient need discharge transport? Yes

## 2025-08-05 NOTE — PLAN OF CARE
Plan of Care Review  Plan of Care Reviewed With: patient  Progress: no change  Outcome Evaluation: Patient AAOx4 Pain controlled with current regimine. Bed alarm is on and call light within reach.

## 2025-08-05 NOTE — PLAN OF CARE
Plan of Care Review  Plan of Care Reviewed With: patient  Progress: improving  Outcome Evaluation: AAOx4, VSS, pain controlled with current regimen. 1 assist with transfer. Fall precaution in place.

## 2025-08-05 NOTE — PROGRESS NOTES
Physical Therapy -  Daily Treatment/Progress Note     Patient: Dorothy Hernandez  Location: [address]  MRN: [id number]  Today's date: 8/5/2025    HISTORY OF PRESENT ILLNESS     Dorothy is a 75 y.o. female admitted on 8/1/2025 with Osteoarthritis of left knee, unspecified osteoarthritis type [M17.12]  Localized osteoarthritis of left knee [M17.12]. Principal problem is No Principal Problem: There is no principal problem currently on the Problem List. Please update the Problem List and refresh..    Past Medical History  Dorothy has a past medical history of AML (acute myeloblastic leukemia) (CMS/HCC), Hypothyroidism, MVP (mitral valve prolapse), and Seasonal allergies.    History of Present Illness   Patient is now s/p L TKA with Dr. Manzano on 8/1/25    PRIOR LEVEL OF FUNCTION AND LIVING ENVIRONMENT     Prior Level of Function      Flowsheet Row Most Recent Value   Dominant Hand right   Ambulation assistive equipment   Transferring assistive equipment   Toileting independent   Bathing independent   Dressing independent   Eating independent   IADLs independent   Driving/Transportation    Communication understands/communicates without difficulty   Prior Level of Function Comment Reports being IND with all mobility and ADLs however recently with knee pain has been using RW   Assistive Device Currently Used at Home walker, front-wheeled        Prior Living Environment      Flowsheet Row Most Recent Value   People in Home alone   Current Living Arrangements apartment   Living Environment Comment Alone in apt with 0STE. Tub shower with SC and GBs. Son is able to help a few hours per day       VITALS AND PAIN     PT Vitals      Date/Time Pulse SpO2 O2 Therapy BP BP Method Pt Position [facility]   08/05/25 0858 92 96 % None (Room air) 130/62 105/53 after standing then 118/58 after ambulation. Automatic Sitting AGA          PT Pain      Date/Time Pain Type Side/Orientation Location Rating: Rest Rating: Activity  Interventions [facility]   08/05/25 0858 Pain Assessment left knee 4 4 position adjusted AGA               Objective     OBJECTIVE     Start time:  0858  End time:  0952  Session Length: 54 min       General Observations  Patient received in bed. She was agreeable to therapy, no issues or concerns identified by nurse prior to session. Awake in bed not eating breakfast.    Precautions: fall, weight bearing  Extremity Weight-Bearing Status: left lower extremity  Left LE Weight-Bearing Status: weight-bearing as tolerated (WBAT)    Limitations/Impairments: safety/cognitive      PT Eval and Treat - 08/05/25 0858          Cognition    Orientation Status oriented x 3     Affect/Mental Status flat/blunted affect;agitated     Follows Commands follows one-step commands;increased processing time needed;initiation impaired;physical/tactile prompts required;repetition of directions required;verbal cues/prompting required;75-90% accuracy        Lower Extremity Range of Motion    Knee, Left (ROM) AROM sitting-18'-78'        Bed Mobility    Bed Mobility Activities supine to sit     [place] close supervision     Comment INitially not able to bring L LE off then bed after  AAROM, then pt. given education to cross R ankle under L to bring LE off the bed then able to get to EOB close supervision. recueing to get fee to the ground sitting at EOB.        Mobility Belt    Mobility Belt Used During Session yes        Sit/Stand Transfer    [place] close supervision     Safety/Cues increased time to complete     Assistive Device walker, front-wheeled     Transfer Comments STS 5 times this session close supervision not following cueing to Pull L LE back under for balance till last timew standing from the car. Siting controlled sitting encouraged knee bending to tolerance to improve ROM of knee.        Gait Training    [place], Gait close supervision     Distance in Feet 35 feet   then sitting then 58 with RW    Pattern step-to      Deviations/Abnormal Patterns antalgic     Comment Pt with step to gait slow steady at times to far into walker effecting balance but no LOB, reeducation to step into back of walker with gait and cueing for turning with walker positioning.        Balance    Static Sitting Balance WFL;supported     Dynamic Sitting Balance mild impairment;supported     Sit to Stand Dynamic Balance mild impairment;supported     Static Standing Balance mild impairment;supported     Dynamic Standing Balance mild impairment;supported     Comment, Balance Nausea and pain at times effecting balance.        Lower Extremity (Therapeutic Exercise)    Exercise Position/Type AROM (active range of motion);AAROM (active assistive range of motion)     General Exercise ankle pumps;quad sets;gluteal sets;heel slides;LAQ (long arc quad);SLR (straight leg raise);hip aDduction;hip aBduction     Reps and Sets x8     Comment Pt with AROM with LAQ this session AAROM SLR        Impairments/Safety Issues    Impairments Affecting Function balance;motor planning;strength;pain;endurance/activity tolerance     Functional Endurance Fair     Safety Issues Affecting Function judgment;sequencing abilities;problem-solving;insight into deficits/self-awareness;safety precaution awareness                      10 Meter Walk Test (Self-Selected Velocity)  Trial One: Ten Meter Walk Test (sec): 98 seconds  Trial Two: Ten Meter Walk Test (sec): 72 seconds  Trial One: Gait Speed (m/s): 0.06 m/s  Trial Two: Gait Speed (m/s): 0.08 m/s  Average Gait Speed (m/s): Two Trials: 0.07 m/s                       Education Documentation  Post op Program Education, taught by Osvaldo Roland PTA at 8/5/2025 10:12 AM.  Learner: Patient  Readiness: Acceptance  Method: Explanation, Demonstration  Response: Verbalizes Understanding, Needs Reinforcement  Comment: Walker safety and use.        Session Outcome  Patient chair position at end of session, call light in reach, personal items in reach,  chair alarm on. Nursing notified about patient's response to therapy/activity, change in vital signs, patient's performance, and patient's position.    AM-PAC™ - Mobility (Current Function)     Turning form your back to your side while in flat bed without using bedrails 4 - None   Moving from lying on your back to sitting on the side of a flat bed without using bedrails 3 - A Little   Moving to and from a bed to a chair 3 - A Little   Standing up from a chair using your arms 3 - A Little   To walk in a hospital room 3 - A Little   Climbing 3-5 steps with a railing 1 - Total   AM-PAC™ Mobility Score 17          ASSESSMENT AND PLAN     Progress Summary  AMPAC17 Pt close supervision OOB to the walker after verbal cueing. Transers sit to stand 5 t imes with education for technique. GAit close supervision with continual cueing for positioning of walker to optimize balance. Car transfers close supervision and cueing. Pt progressing to goals set by PT, however still need alot of cueing for safety and balance. Pt will benefit from cont. PT.    Patient/Family Therapy Goals Statement: To go home    PT Plan      Flowsheet Row Most Recent Value   Rehab Potential good, to achieve stated therapy goals at 08/05/2025 0858   Therapy Frequency 5 times/wk at 08/05/2025 0858   Planned Therapy Interventions balance training, bed mobility training, gait training, home exercise program, patient/family education, strengthening, ROM (range of motion), transfer training at 08/05/2025 0858       PT Discharge Recommendations      Flowsheet Row Most Recent Value   PT Recommended Discharge Disposition skilled nursing facility at 08/05/2025 0858   Anticipated Equipment Needs if Discharged Home (PT) none at 08/05/2025 0858            PT Goals      Flowsheet Row Most Recent Value   Bed Mobility Goal 1    Activity/Assistive Device bed mobility activities, all at 08/01/2025 1746   [place] modified independence at 08/01/2025 1746   Time Frame by  discharge at 08/01/2025 1746   Progress/Outcome goal ongoing at 08/01/2025 1746   Transfer Goal 1    Activity/Assistive Device all transfers, walker, front-wheeled at 08/01/2025 1746   [place] modified independence at 08/01/2025 1746   Time Frame by discharge at 08/01/2025 1746   Progress/Outcome goal ongoing at 08/01/2025 1746   Gait Training Goal 1    Activity/Assistive Device gait (walking locomotion), walker, front-wheeled at 08/01/2025 1746   [place] modified independence at 08/01/2025 1746   Distance 75 at 08/01/2025 1746   Time Frame by discharge at 08/01/2025 1746   Progress/Outcome goal ongoing at 08/01/2025 1746

## 2025-08-06 ENCOUNTER — APPOINTMENT (INPATIENT)
Dept: RADIOLOGY | Facility: HOSPITAL | Age: 75
DRG: 470 | End: 2025-08-06
Attending: NURSE PRACTITIONER
Payer: COMMERCIAL

## 2025-08-06 VITALS
OXYGEN SATURATION: 97 % | BODY MASS INDEX: 25.86 KG/M2 | TEMPERATURE: 97.9 F | DIASTOLIC BLOOD PRESSURE: 59 MMHG | HEIGHT: 66 IN | HEART RATE: 95 BPM | WEIGHT: 160.9 LBS | SYSTOLIC BLOOD PRESSURE: 132 MMHG | RESPIRATION RATE: 18 BRPM

## 2025-08-06 PROCEDURE — 93971 EXTREMITY STUDY: CPT | Mod: TC,LT

## 2025-08-06 PROCEDURE — 97116 GAIT TRAINING THERAPY: CPT | Mod: GP,U8

## 2025-08-06 PROCEDURE — 63700000 HC SELF-ADMINISTRABLE DRUG: Performed by: NURSE PRACTITIONER

## 2025-08-06 PROCEDURE — 97530 THERAPEUTIC ACTIVITIES: CPT | Mod: GP,U8

## 2025-08-06 PROCEDURE — 97535 SELF CARE MNGMENT TRAINING: CPT | Mod: GO,U8

## 2025-08-06 PROCEDURE — 63700000 HC SELF-ADMINISTRABLE DRUG: Performed by: ORTHOPAEDIC SURGERY

## 2025-08-06 RX ADMIN — FAMOTIDINE 20 MG: 20 TABLET, FILM COATED ORAL at 09:11

## 2025-08-06 RX ADMIN — VALACYCLOVIR HYDROCHLORIDE 500 MG: 1 TABLET, FILM COATED ORAL at 09:10

## 2025-08-06 RX ADMIN — ASPIRIN 81 MG: 81 TABLET, COATED ORAL at 09:10

## 2025-08-06 RX ADMIN — ACETAMINOPHEN 650 MG: 325 TABLET ORAL at 07:03

## 2025-08-06 RX ADMIN — POLYETHYLENE GLYCOL 3350 17 G: 17 POWDER, FOR SOLUTION ORAL at 09:11

## 2025-08-06 RX ADMIN — DOCUSATE SODIUM 100 MG: 100 CAPSULE, LIQUID FILLED ORAL at 09:11

## 2025-08-06 RX ADMIN — DOCUSATE SODIUM AND SENNOSIDES 1 TABLET: 8.6; 5 TABLET, FILM COATED ORAL at 09:11

## 2025-08-06 RX ADMIN — OXYCODONE HYDROCHLORIDE 10 MG: 5 TABLET ORAL at 04:16

## 2025-08-06 RX ADMIN — ACETAMINOPHEN 650 MG: 325 TABLET ORAL at 12:36

## 2025-08-06 RX ADMIN — OXYCODONE HYDROCHLORIDE 10 MG: 5 TABLET ORAL at 09:14

## 2025-08-06 RX ADMIN — LEVOTHYROXINE SODIUM 125 MCG: 0.12 TABLET ORAL at 07:04

## 2025-08-06 ASSESSMENT — COGNITIVE AND FUNCTIONAL STATUS - GENERAL
MOVING TO AND FROM BED TO CHAIR: 3 - A LITTLE
HELP NEEDED FOR BATHING: 2 - A LOT
AFFECT: WFL
AFFECT: WFL
CLIMB 3 TO 5 STEPS WITH RAILING: 2 - A LOT
MOVING TO AND FROM BED TO CHAIR: 3 - A LITTLE
STANDING UP FROM CHAIR USING ARMS: 3 - A LITTLE
HELP NEEDED FOR PERSONAL GROOMING: 3 - A LITTLE
EATING MEALS: 4 - NONE
WALKING IN HOSPITAL ROOM: 3 - A LITTLE
DRESSING REGULAR LOWER BODY CLOTHING: 2 - A LOT
CLIMB 3 TO 5 STEPS WITH RAILING: 2 - A LOT
WALKING IN HOSPITAL ROOM: 3 - A LITTLE
DRESSING REGULAR UPPER BODY CLOTHING: 3 - A LITTLE
STANDING UP FROM CHAIR USING ARMS: 3 - A LITTLE
TOILETING: 3 - A LITTLE

## 2025-08-06 NOTE — PLAN OF CARE
Problem: Adult Inpatient Plan of Care  Goal: Plan of Care Review  Flowsheets (Taken 8/6/2025 0119)  Progress: improving  Outcome Evaluation: Pt AAOx4. Ambulating to bathroom with staff assistance and walker. Oxy given for pain to L knee. Resting in bed. Call bell within reach.  Plan of Care Reviewed With: patient   Plan of Care Review  Plan of Care Reviewed With: patient  Progress: improving  Outcome Evaluation: Pt AAOx4. Ambulating to bathroom with staff assistance and walker. Oxy given for pain to L knee. Resting in bed. Call bell within reach.

## 2025-08-06 NOTE — PROGRESS NOTES
Physical Therapy -  Daily Treatment/Progress Note     Patient: Dorothy Hernandez  Location: [address] [address]  MRN: [id number]  Today's date: 8/6/2025    HISTORY OF PRESENT ILLNESS     Dorothy is a 75 y.o. female admitted on 8/1/2025 with Osteoarthritis of left knee, unspecified osteoarthritis type [M17.12]  Localized osteoarthritis of left knee [M17.12]. Principal problem is No Principal Problem: There is no principal problem currently on the Problem List. Please update the Problem List and refresh..    Past Medical History  Dorothy has a past medical history of AML (acute myeloblastic leukemia) (CMS/HCC), Hypothyroidism, MVP (mitral valve prolapse), and Seasonal allergies.    History of Present Illness   Patient is now s/p L TKA with Dr. Manzano on 8/1/25    PRIOR LEVEL OF FUNCTION AND LIVING ENVIRONMENT     Prior Level of Function      Flowsheet Row Most Recent Value   Dominant Hand right   Ambulation assistive equipment   Transferring assistive equipment   Toileting independent   Bathing independent   Dressing independent   Eating independent   IADLs independent   Driving/Transportation    Communication understands/communicates without difficulty   Prior Level of Function Comment Reports being IND with all mobility and ADLs however recently with knee pain has been using RW   Assistive Device Currently Used at Home walker, front-wheeled        Prior Living Environment      Flowsheet Row Most Recent Value   People in Home alone   Current Living Arrangements apartment   Living Environment Comment Alone in apt with 0STE. Tub shower with SC and GBs. Son is able to help a few hours per day       VITALS AND PAIN     PT Vitals      Date/Time Pulse SpO2 Pt Activity O2 Therapy BP BP Location BP Method Pt Position Observations Who   08/06/25 1005 90 96 % At rest None (Room air) 99/68 Right upper arm Automatic Sitting -- MTU   08/06/25 1020 95 97 % At rest None (Room air) 102/53 Right upper arm Automatic  Sitting post-ambulation MTU          PT Pain      Date/Time Pain Type Side/Orientation Location Rating: Rest Rating: Activity Radiation to Description Interventions [facility]   08/06/25 1005 Pain Assessment;Post Activity left knee 5 7 leg, left incisional;aching;constant care clustered;cold applied MTU               Objective     OBJECTIVE     Start time:  1002  End time:  1026  Session Length: 24 min  Mode of Treatment: co-treatment  Reason for co-treatment: post-op DC priority, PT focus on mobility    General Observations  Patient received supine, in bed (+bed alarm). She was agreeable to therapy, no issues or concerns identified by nurse prior to session.      Precautions: fall, weight bearing  Extremity Weight-Bearing Status: left lower extremity  Left LE Weight-Bearing Status: weight-bearing as tolerated (WBAT)    Limitations/Impairments: safety/cognitive      PT Eval and Treat - 08/06/25 1002          Cognition    Orientation Status oriented x 3     Affect/Mental Status WFL     Follows Commands follows one-step commands;increased processing time needed;verbal cues/prompting required;repetition of directions required     Cognitive Function executive function deficit;safety deficit     Executive Function Deficit minimal deficit;information processing;insight/awareness of deficits;self-monitoring/self-correction     Safety Deficit minimal deficit;insight into deficits/self-awareness;problem-solving;safety precautions follow-through/compliance     Comment, Cognition pleasant and cooperative, receptive to PT cues, education and recommendations.        Bed Mobility    Bed Mobility Activities supine to sit;sit to supine     [place] close supervision     Safety/Cues increased time to complete;verbal cues;technique     Assistive Device head of bed elevated     Comment out of bed to L, self-assisting RLE w/ LLE hooking.        Mobility Belt    Mobility Belt Used During Session yes        Sit/Stand Transfer    Surface  edge of bed     [place] close supervision     Safety/Cues increased time to complete;verbal cues;hand placement     Assistive Device walker, front-wheeled     Transfer Comments steady in standing, fair eccentric control returning to sit (limited by decreased L knee flexion ROM)        Toilet Transfer    Transfer Technique sit/stand     [place] minimum assist (75% or more patient effort);1 person assist     Safety/Cues increased time to complete;verbal cues;hand placement;preparatory posture     Assistive Device grab bars/safety frame;walker, front-wheeled     Comment L grab-bar, increased difficulty w/ lower seat height        Gait Training    [place], Gait close supervision     Safety/Cues increased time to complete;verbal cues;gait deviations;technique     Assistive Device walker, front-wheeled     Distance in Feet 45 feet     Pattern step-to     Deviations/Abnormal Patterns left sided deviations;antalgic;mariana decreased;gait speed decreased;step length decreased;weight shifting decreased     Left Sided Gait Deviations heel strike decreased     Comment 10 ft + 45 ft, cues for L heel strike to encourage knee extension through stance. intermittent standing rest breaks due to fatigue.        Stairs Training    Comment will assess as appropriate in future sessions        Balance    Static Sitting Balance WFL;sitting, edge of bed     Dynamic Sitting Balance mild impairment;sitting, edge of bed     Sit to Stand Dynamic Balance mild impairment;supported     Static Standing Balance WFL;supported     Dynamic Standing Balance mild impairment;supported     Comment, Balance w/ RW        Impairments/Safety Issues    Impairments Affecting Function balance;motor planning;strength;pain;endurance/activity tolerance;range of motion (ROM);precautions     Functional Endurance fair, improving, hypotensive but stable     Safety Issues Affecting Function judgment;sequencing abilities;problem-solving;insight into  deficits/self-awareness;safety precaution awareness                                              Session Outcome  Patient upright, in bed at end of session, bed alarm on, all needs met, call light in reach, personal items in reach. Nursing notified about patient's performance, patient's position, and patient's response to therapy/activity.    AM-PAC™ - Mobility (Current Function)     Turning form your back to your side while in flat bed without using bedrails 4 - None   Moving from lying on your back to sitting on the side of a flat bed without using bedrails 4 - None   Moving to and from a bed to a chair 3 - A Little   Standing up from a chair using your arms 3 - A Little   To walk in a hospital room 3 - A Little   Climbing 3-5 steps with a railing 2 - A Lot   AM-PAC™ Mobility Score 19          ASSESSMENT AND PLAN     Progress Summary  Patient seen POD #5. Continues to require close supervision and increased time for bed mobility, sit<>stand transfers and level ambulation w/ RW. Verbal cues for LE sequencing and L knee extension through stance to prevent knee buckling and education on contracture formation. Limited by L knee ROM deficits, LLE weakness and balance deficits placing her below her baseline and at increased risk for falls. Will benefit from SNF when medically appropriate to address post-op deficits and restore previous level of safety and independence prior to returning home. PT will continue to follow. AM-PAC 19/24    Patient/Family Therapy Goals Statement: To go home    PT Plan      Flowsheet Row Most Recent Value   Rehab Potential good, to achieve stated therapy goals at 08/05/2025 0858   Therapy Frequency 5 times/wk at 08/05/2025 0858   Planned Therapy Interventions balance training, bed mobility training, gait training, home exercise program, patient/family education, strengthening, ROM (range of motion), transfer training at 08/05/2025 0858       PT Discharge Recommendations      Flowsheet Row  Most Recent Value   PT Recommended Discharge Disposition skilled nursing facility at 08/05/2025 0858   Anticipated Equipment Needs if Discharged Home (PT) none at 08/05/2025 0858            PT Goals      Flowsheet Row Most Recent Value   Bed Mobility Goal 1    Activity/Assistive Device bed mobility activities, all at 08/01/2025 1746   [place] modified independence at 08/01/2025 1746   Time Frame by discharge at 08/01/2025 1746   Progress/Outcome goal ongoing at 08/01/2025 1746   Transfer Goal 1    Activity/Assistive Device all transfers, walker, front-wheeled at 08/01/2025 1746   [place] modified independence at 08/01/2025 1746   Time Frame by discharge at 08/01/2025 1746   Progress/Outcome goal ongoing at 08/01/2025 1746   Gait Training Goal 1    Activity/Assistive Device gait (walking locomotion), walker, front-wheeled at 08/01/2025 1746   [place] modified independence at 08/01/2025 1746   Distance 75 at 08/01/2025 1746   Time Frame by discharge at 08/01/2025 1746   Progress/Outcome goal ongoing at 08/01/2025 1746

## 2025-08-06 NOTE — PLAN OF CARE
Care Coordination Discharge Plan Summary    Admission Assessment Summary    General Information  Readmission Within the last 30 days:    Does patient have a :    Patient-Specific Goals (include timeframe): pain control and safe dc after hospital admission    Living Arrangements  Arrived From:    Current Living Arrangements: apartment  People in Home: alone  Home Accessibility:    Living Arrangement Comments:      Social Drivers of Health - Screenings  Housing Stability     Utility Access     Transportation Needs       Functional Status Prior to Admission  Assistive Device/Animal Currently Used at Home: walker, front-wheeled  Functional Status Comments:    IADL Comments:      Discharge Needs Assessment    Concerns to be Addressed: care coordination/care conferences, discharge planning  Current Discharge Risk: lives alone  Anticipated Changes Related to Illness: inability to care for self    Discharge Plan Summary    Patient Choice  Offered/Gave Vendor List: yes  Patient and/or patient guardian/advocate was made aware of their right to choose a provider. A list of eligible providers was presented and reviewed with the patient and/or patient guardian/advocate in written and/or verbal form. The list delineates providers in the patient’s desired geographic area who are participating in the Medicare program and/or providers contracted with the patient’s primary insurance. The Medicare list and quality ratings were obtained from the Medicare.gov [medicare.gov] website.    Concerns / Comments: EILEEN received call from [facility]ero[facility] liaison Cee SANCHEZ who stated that auth received. Pt authorized for 7 days with last covered day on 8/13. Auth# [id number]. Cee requested to be updated re. transport time. RN report# [phone]. SW met with pt at bedside. Pt reported her son will transport her. EILEEN updated RN, MD and Ortho CARLA. SW notified by Ortho LIDIANP that Ultrasound negative. Per RN Pt's son to pick pt up  around 1500. SW updated Cee SANCHEZ at [facility]. transport time.    Discharge Plan:  Disposition/Destination: Skilled Nursing Facility - Other  / Skilled Nursing Facility  Destination - Admitted Since 8/1/2025       Service Provider Services Address Phone Fax Patient Preferred Last Updated    [facility] [address] [place] [id number] [phone] -- Latia Duenas MSW 08/05/2025 10:34 AM            Connection to Community          Discharge Transportation:  Is Out of Hospital DNR needed at Discharge: no  Does patient need discharge transport? No (Pt reports her son will transport)

## 2025-08-06 NOTE — PROGRESS NOTES
Occupational Therapy -  Daily Treatment/Progress Note     Patient: Dorothy Hernandez  Location: [facility] 4A 4026  MRN: [id number]  Today's date: 8/6/2025    HISTORY OF PRESENT ILLNESS     Dorothy is a 75 y.o. female admitted on 8/1/2025 with Osteoarthritis of left knee, unspecified osteoarthritis type [M17.12]  Localized osteoarthritis of left knee [M17.12]. Principal problem is No Principal Problem: There is no principal problem currently on the Problem List. Please update the Problem List and refresh..    Past Medical History  Dorothy has a past medical history of AML (acute myeloblastic leukemia) (CMS/HCC), Hypothyroidism, MVP (mitral valve prolapse), and Seasonal allergies.    History of Present Illness   Patient is now s/p L TKA with Dr. Manzano on 8/1/25    PRIOR LEVEL OF FUNCTION AND LIVING ENVIRONMENT     Prior Level of Function      Flowsheet Row Most Recent Value   Dominant Hand right   Ambulation assistive equipment   Transferring assistive equipment   Toileting independent   Bathing independent   Dressing independent   Eating independent   IADLs independent   Driving/Transportation    Communication understands/communicates without difficulty   Prior Level of Function Comment Reports being IND with all mobility and ADLs however recently with knee pain has been using RW   Assistive Device Currently Used at Home walker, front-wheeled        Prior Living Environment      Flowsheet Row Most Recent Value   People in Home alone   Current Living Arrangements apartment   Living Environment Comment Alone in apt with 0STE. Tub shower with SC and GBs. Son is able to help a few hours per day     Occupational Profile      Flowsheet Row Most Recent Value   Successful Occupations retired   Occupational History/Life Experiences IND / mod I PTA   Environmental Supports and Barriers lives alone, reports has a supportive neighbor       VITALS AND PAIN     OT Vitals      Date/Time Pulse SpO2 Pt Activity O2  Therapy BP BP Location BP Method Pt Position Observations [facility]   08/06/25 1005 90 96 % At rest None (Room air) 99/68 Right upper arm Automatic Sitting -- MTU   08/06/25 1020 95 97 % At rest None (Room air) 102/53 Right upper arm Automatic Sitting post-ambulation MTU          OT Pain      Date/Time Pain Type Side/Orientation Location Rating: Rest Rating: Activity Description Radiation to Interventions [facility]   08/06/25 1005 Pain Assessment;Post Activity knee left 5 7 incisional;aching;constant leg, left care clustered;cold applied MTU               Objective     OBJECTIVE     Start time:  1001  End time:  1027  Session Length: 26 min  Mode of Treatment: co-treatment  Reason for co-treatment: d/c priority    General Observations  Patient received upright, in bed. She was agreeable to therapy, no issues or concerns identified by nurse prior to session. call light on asking to use bathroom    Precautions: fall, weight bearing  Extremity Weight-Bearing Status: left lower extremity  Left LE Weight-Bearing Status: weight-bearing as tolerated (WBAT)    Limitations/Impairments: safety/cognitive      OT Eval and Treat - 08/06/25 1001          Cognition    Orientation Status oriented x 3     Affect/Mental Status WFL     Follows Commands follows one-step commands;over 90% accuracy     Comment, Cognition cooperative and pleasant, cues for hand placement/ carryover        Bed Mobility    Bed Mobility Activities supine to sit;sit to supine     [place] close supervision;minimum assist (75% or more patient effort)     Safety/Cues sequencing;hand placement;technique     Assistive Device head of bed elevated;bed rails     Comment MIN A sit > supine w/ LLE        Mobility Belt    Mobility Belt Used During Session yes        Sit/Stand Transfer    Surface edge of bed     [place] close supervision     Safety/Cues proper use of assistive device;hand placement     Assistive Device walker, front-wheeled        Toilet Transfer     Transfer Technique sit/stand     [place] minimum assist (75% or more patient effort);1 person assist     Assistive Device grab bars/safety frame        Functional Mobility    Distance in room/bathroom     Functional Mobility [place] minimum assist (75% or more patient effort);1 person assist     Safety/Cues technique;proper use of assistive device;increased time to complete     Assistive Device walker, front-wheeled        Lower Body Dressing    Tasks socks     [place] maximum assist (25-49% patient effort)     Position edge of bed sitting        Grooming    Tasks washes, rinses and dries hands     [place] supervision;set up     Position supported sitting     Comment preference to perform seated d/t pain        Toileting    Tasks adjust/manage clothing;perform bladder hygiene     [place] close supervision     Position unsupported standing;unsupported sitting     Adaptive Equipment grab bar/safety frame        Balance    Static Sitting Balance WFL;sitting, edge of bed     Dynamic Sitting Balance mild impairment;sitting, edge of bed     Sit to Stand Dynamic Balance mild impairment;supported     Static Standing Balance WFL;supported     Dynamic Standing Balance mild impairment;supported     Balance Interventions occupation based/functional task     Comment, Balance w/ rw        Impairments/Safety Issues    Impairments Affecting Function balance;motor planning;strength;pain;endurance/activity tolerance;range of motion (ROM);precautions     Functional Endurance fair + pain 6/10     Safety Issues Affecting Function positioning of assistive device                     Functional Reach Test  Trial One: Functional Reach Test (in):  (requires MIN A w/ rw, deferred)                           Session Outcome  Patient upright, in bed at end of session, chair alarm on, personal items in reach. Nursing notified about patient's performance and patient's position.    AM-PAC™ - ADL (Current Function)      Putting on/taking off regular lower body clothing 2 - A Lot   Bathing 2 - A Lot   Toileting 3 - A Little   Putting on/taking off regular upper body clothing 3 - A Little   Help for taking care of personal grooming 3 - A Little   Eating meals 4 - None   AM-PAC™ ADL Score 17          ASSESSMENT AND PLAN     Progress Summary  [facility] 17. OT tx complete. Pt is CS for STS from EOB w/ rw. Pt completing toilet transfer with MIN A w/ GB. Pt is MIN A x1 w/ rw for short household distance mobility to bathroom. Pt is MAX A for LBD. Pt set up for seated grooming. Pt is limited by pain, ROM, balance, endurance and strength. Rec cont OT POC    Patient/Family Therapy Goal Statement: less pain    OT Plan      Flowsheet Row Most Recent Value   Rehab Potential good, to achieve stated therapy goals at 08/03/2025 0947   Therapy Frequency 5 times/wk at 08/03/2025 0947   Planned Therapy Interventions activity tolerance training, adaptive equipment training, functional balance retraining, occupation/activity based interventions, ROM/therapeutic exercise, strengthening exercise, transfer/mobility retraining, BADL retraining at 08/03/2025 0947       OT Discharge Recommendations      Flowsheet Row Most Recent Value   OT Recommended Discharge Disposition skilled nursing facility at 08/03/2025 0947   Anticipated Equipment Needs if Discharged Home (OT) commode, 3-in-1, walker, front-wheeled, dressing equipment at 08/03/2025 0947            OT Goals      Flowsheet Row Most Recent Value   Bed Mobility Goal 1    Activity/Assistive Device bed mobility activities, all at 08/02/2025 0910   [place] modified independence at 08/02/2025 0910   Time Frame by discharge at 08/02/2025 0910   Progress/Outcome goal ongoing at 08/02/2025 0910   Transfer Goal 1    Activity/Assistive Device toilet at 08/02/2025 0910   [place] modified independence at 08/02/2025 0910   Time Frame by discharge at 08/02/2025 0910   Progress/Outcome goal ongoing at  08/02/2025 0910   Dressing Goal 1    Activity/Adaptive Equipment dressing skills, all at 08/02/2025 0910   [place] modified independence at 08/02/2025 0910   Time Frame by discharge at 08/02/2025 0910   Progress/Outcome goal ongoing at 08/02/2025 0910   Toileting Goal 1    Activity/Assistive Device toileting skills, all at 08/02/2025 0910   [place] modified independence at 08/02/2025 0910   Time Frame by discharge at 08/02/2025 0910   Progress/Outcome goal ongoing at 08/02/2025 0910

## 2025-08-06 NOTE — PROGRESS NOTES
Orthopedic Progress Note    Subjective     Patient seen and examined at bedside. Doing well this AM. Pain is well controlled. Some anteromedial lower leg soreness, appropriate given recent procedure. Denies any numbness or tingling.  Walked 35 feet, dispo planning continues.    Objective       Vitals:    08/06/25 0311   BP:    Pulse: 84   Resp:    Temp:    SpO2:        General: VSS, NAD    Extremities:  LLE:  Incision C/D/I  Compartments soft and compressible  Motor intact FHL/EHL/TA/GSC/Q/HS  SILT DPN/SPN/T/S/S  2+ DP pulses  Foot WWP  BCR      Assessment     75 y.o. female s/p L TKA with Dr. Manzano, rec for SNF    Plan     -Ancef x24h  -DVT ppx: ASA BID  -WBAT BLE  -PT/OT   -Pain control  -Follow-up   -appreciate sw, care management recs   -Dispo: pending PT/OT/pain control      Sarabjit Domínguez,   Orthopedic Surgery, PGY-2  Pager: [phone]

## 2025-08-08 PROCEDURE — 99306 1ST NF CARE HIGH MDM 50: CPT | Performed by: INTERNAL MEDICINE

## 2025-08-08 NOTE — DISCHARGE SUMMARY
Ortho Discharge Summary    Admitting Provider: Marco Manzano DO  Discharge Provider: No att. providers found  Primary Care Physician at Discharge: Price Erazo [phone]     Admission Date: 8/1/2025     Discharge Date: 8/8/2025    Primary Discharge Diagnosis  S/P TKR (total knee replacement), left    Secondary Discharge Diagnosis      Discharge Disposition  Skilled Nursing Facility - Other   Code Status at Discharge: Prior    Discharge Medications     Medication List        START taking these medications      acetaminophen 325 mg tablet  Commonly known as: TYLENOL  Take 2 tablets (650 mg total) by mouth every 6 (six) hours.  Dose: 650 mg     aspirin 81 mg enteric coated tablet  Take 1 tablet (81 mg total) by mouth 2 (two) times a day Indications: prevention of venous thromboembolism (VTE).  Dose: 81 mg     oxyCODONE 10 mg immediate release tablet  Commonly known as: ROXICODONE  Take 0.5-1 tablets (5-10 mg total) by mouth every 4 (four) hours as needed for pain for up to 5 days.  Dose: 5-10 mg     polyethylene glycol 17 gram packet  Commonly known as: MIRALAX  Take 17 g by mouth daily for 3 days.  Dose: 17 g     sennosides-docusate sodium 8.6-50 mg  Commonly known as: SENOKOT-S  Take 1 tablet by mouth 2 (two) times a day.  Dose: 1 tablet            CONTINUE taking these medications      famotidine 20 mg tablet  Commonly known as: PEPCID  Take 20 mg by mouth 2 (two) times a day.  Dose: 20 mg     levothyroxine 125 mcg tablet  Commonly known as: SYNTHROID  Take 125 mcg by mouth daily.  Dose: 125 mcg     rosuvastatin 10 mg tablet  Commonly known as: CRESTOR  Take 10 mg by mouth nightly.  Dose: 10 mg     TIBSOVO 250 mg tablet  Take 250 mg by mouth daily before lunch.  Dose: 250 mg  Generic drug: ivosidenib     valACYclovir 500 mg tablet  Commonly known as: VALTREX  Take 500 mg by mouth 2 (two) times a day.  Dose: 500 mg            STOP taking these medications      NOT IN DATABASE              Active Issues  Requiring Follow-up  Issue: None  Responsible Individual: Patient  What is Needed: Follow up  Follow-up Appointments Arranged: Yes     Outpatient Follow-Up  Encounter Information    This patient does not currently have any appointments scheduled.           Test Results Pending at Discharge  Unresulted Labs (From admission, onward)      None            DETAILS OF HOSPITAL STAY    Presenting Problem/History of Present Illness  Osteoarthritis of left knee, unspecified osteoarthritis type [M17.12]  Localized osteoarthritis of left knee [M17.12]  S/P TKR (total knee replacement), left [Z96.652]      Hospital Course  Stable    Operative Procedures Performed  Procedure(s):  LEFT KNEE ARTHROPLASTY TOTAL  Consults: general medicine

## 2025-08-15 PROCEDURE — 99316 NF DSCHRG MGMT 30 MIN+: CPT | Performed by: INTERNAL MEDICINE

## 2025-08-19 ASSESSMENT — PAIN SCALES - GENERAL: PAIN_LEVEL: 3

## 2025-08-19 NOTE — ANESTHESIA POSTPROCEDURE EVALUATION
Patient: Dorothy Hernandez    Procedure Summary       Date: 08/01/25 Room / Location:  OR 5 / PH OR    Anesthesia Start: 0940 Anesthesia Stop: 1158    Procedure: LEFT KNEE ARTHROPLASTY TOTAL (Left: Knee) Diagnosis:       Osteoarthritis of left knee, unspecified osteoarthritis type      (Osteoarthritis of left knee, unspecified osteoarthritis type [M17.12])    Surgeons: Marco Manzano DO Responsible Provider: Charly Guerrero MD    Anesthesia Type: spinal ASA Status: 3            Anesthesia Type: spinal  PACU Vitals  8/1/2025 1153 - 8/1/2025 1253        8/1/2025  1202 8/1/2025  1215 8/1/2025  1245       BP: 145/65 137/59 132/62     Temp: 36.4 °C (97.5 °F) 36.4 °C (97.6 °F) 36.2 °C (97.1 °F)     Pulse: 72 68 66     Resp: 12 12 12     SpO2: 99 % -- --               Anesthesia Post Evaluation    Pain score: 3  Pain management: adequate  Patient location during evaluation: PACU  Patient participation: complete - patient participated  Level of consciousness: awake and alert  Cardiovascular status: acceptable  Airway Patency: adequate  Respiratory status: acceptable  Hydration status: acceptable  Anesthetic complications: no

## (undated) DEVICE — IRRISEPT WOUND DEBRIDEMENT SYSTEM 2X450

## (undated) DEVICE — SET HANDPIECE INTERPULSE

## (undated) DEVICE — PACK RFID KNEE ADD ON

## (undated) DEVICE — SUTURE QUILL 2 PDO RX-2066Q

## (undated) DEVICE — TUBING SMOKE EVAC PENCIL COATED

## (undated) DEVICE — BANDAGE COMPRESSION W6INXL10YD WHITE BEIGE POLYESTER COTTON

## (undated) DEVICE — PACK UNIVERSAL TOTAL JOINT

## (undated) DEVICE — BLADE SAGITTAL DUAL CUT 4118-137-90

## (undated) DEVICE — APPLICATOR CHLORAPREP 26ML ORANGE TINT

## (undated) DEVICE — MANIFOLD FOUR PORT NEPTUNE

## (undated) DEVICE — PAD GROUND ELECTROSURGICAL W/CORD

## (undated) DEVICE — PACK OR TOWEL

## (undated) DEVICE — SHEET DRAPE 3/4 REVERSEFOLD 53X77

## (undated) DEVICE — CUFF TOURNIQUET DISP 34 X 4

## (undated) DEVICE — MANIFOLD SINGLE PORT NEPTUNE

## (undated) DEVICE — HOOD FLYTE SURGICOOL

## (undated) DEVICE — PIN TROCAR DRILL HEADLESS 3.2X75 MM

## (undated) DEVICE — TOTAL KNEE PERSONA FEMALE HEX SCREW 2.5MM

## (undated) DEVICE — GOWN SIRUS FABRNF RAGLAN XL ST 28/CS

## (undated) DEVICE — DRESSING ANTIMICROBIAL FOAM OPTIFOAM POST OP ADHESIVE 4X8 IN

## (undated) DEVICE — SOLN IRRIG STER WATER 1000ML

## (undated) DEVICE — SYRINGE DISP LUER-LOK 30 CC

## (undated) DEVICE — VEST STERILE

## (undated) DEVICE — CLOTH PREPPING SAGE 2% CHG 2/PK

## (undated) DEVICE — SUTURE POLYSORB 1 VIOLET 1X30 GS-21

## (undated) DEVICE — COVER BACK TABLE REINFORCED

## (undated) DEVICE — SUTURE POLYSORB 2-0 UNDYED 1X30 GS-21

## (undated) DEVICE — SUCTION 18FR FRAZIER DISPOSABLE